# Patient Record
Sex: FEMALE | Race: WHITE | NOT HISPANIC OR LATINO | Employment: FULL TIME | URBAN - METROPOLITAN AREA
[De-identification: names, ages, dates, MRNs, and addresses within clinical notes are randomized per-mention and may not be internally consistent; named-entity substitution may affect disease eponyms.]

---

## 2017-02-25 ENCOUNTER — OFFICE VISIT (OUTPATIENT)
Dept: URGENT CARE | Facility: URGENT CARE | Age: 22
End: 2017-02-25
Payer: COMMERCIAL

## 2017-02-25 VITALS
WEIGHT: 114 LBS | HEART RATE: 88 BPM | DIASTOLIC BLOOD PRESSURE: 58 MMHG | SYSTOLIC BLOOD PRESSURE: 107 MMHG | TEMPERATURE: 98.9 F | OXYGEN SATURATION: 100 % | BODY MASS INDEX: 20.19 KG/M2

## 2017-02-25 DIAGNOSIS — H66.012 ACUTE SUPPURATIVE OTITIS MEDIA OF LEFT EAR WITH SPONTANEOUS RUPTURE OF TYMPANIC MEMBRANE, RECURRENCE NOT SPECIFIED: Primary | ICD-10-CM

## 2017-02-25 PROCEDURE — 99213 OFFICE O/P EST LOW 20 MIN: CPT | Performed by: PHYSICIAN ASSISTANT

## 2017-02-25 RX ORDER — AMOXICILLIN 875 MG
875 TABLET ORAL 2 TIMES DAILY
Qty: 20 TABLET | Refills: 0 | Status: SHIPPED | OUTPATIENT
Start: 2017-02-25 | End: 2017-05-03

## 2017-02-25 NOTE — NURSING NOTE
"Chief Complaint   Patient presents with     Otalgia     fluid on pillow this morning       Initial /58 (BP Location: Left arm, Patient Position: Chair, Cuff Size: Adult Regular)  Pulse 88  Temp 98.9  F (37.2  C) (Oral)  Wt 114 lb (51.7 kg)  SpO2 100%  BMI 20.19 kg/m2 Estimated body mass index is 20.19 kg/(m^2) as calculated from the following:    Height as of 5/5/16: 5' 3\" (1.6 m).    Weight as of this encounter: 114 lb (51.7 kg).  Medication Reconciliation: complete   Mirian Thakur CMA      "

## 2017-02-25 NOTE — PROGRESS NOTES
SUBJECTIVE:                                                    Zainab Sykes is a 21 year old female who presents to clinic today for the following health issues:      RESPIRATORY SYMPTOMS      Duration: 1 day    Description  nasal congestion, rhinorrhea, sore throat, facial pain/pressure, wheezing, ear pain left, headache and fatigue/malaise    Severity: moderate    Accompanying signs and symptoms: None    History (predisposing factors):  none    Precipitating or alleviating factors: None    Therapies tried and outcome:  none     Lots of drainage from left ear on her pillow. Left ear pain bothers her the most.  H/o ear infections and PE tubes as a child      No Known Allergies    Past Medical History   Diagnosis Date     Anxiety and depression          Current Outpatient Prescriptions on File Prior to Visit:  loratadine (CLARITIN) 10 MG tablet take 10 mg by mouth daily.   acetaminophen (TYLENOL) 325 MG tablet take 1-2 Tabs by mouth every 6 hours as needed.   ibuprofen (ADVIL,MOTRIN) 200 MG tablet take 200 mg by mouth every 4 hours as needed.     No current facility-administered medications on file prior to visit.     Social History   Substance Use Topics     Smoking status: Current Some Day Smoker     Smokeless tobacco: Never Used     Alcohol use No       ROS:  Consitutional: As above  ENT: As above  Respiratory: As above    OBJECTIVE:  /58 (BP Location: Left arm, Patient Position: Chair, Cuff Size: Adult Regular)  Pulse 88  Temp 98.9  F (37.2  C) (Oral)  Wt 114 lb (51.7 kg)  SpO2 100%  BMI 20.19 kg/m2  GENERAL APPEARANCE: healthy, alert and no distress  EYES: conjunctiva clear  EARS: No cerumen.   Ear canals w/o erythema. L TM dull, scar from prior PE tube. No obvious perforation but had quite a bit of drainage on pillow.    NOSE/MOUTH: Nose and mouth without ulcers, erythema or lesions  SINUSES: No maxillary sinus tenderness.  THROAT: Mild erythema w/o tonsillar enlargement . No exudates  NECK: supple,  nontender, no lymphadenopathy  RESP: lungs clear to auscultation - no rales, rhonchi or wheezes  CV: regular rates and rhythm, normal S1 S2, no murmur noted  NEURO: awake, alert        ASSESSMENT: Well appearing.    ICD-10-CM    1. Acute suppurative otitis media of left ear with spontaneous rupture of tympanic membrane, recurrence not specified H66.012 amoxicillin (AMOXIL) 875 MG tablet         PLAN:  Lots of rest and fluids.  RTC if any worsening symptoms or if not improving.    Jyotsna Pace PA-C

## 2017-02-25 NOTE — LETTER
[facility]  [address]  [place] [address]  Phone: [phone]    February 25, 2017        Zainab Sykes  [address] [place] [address] [address] [place] [address]          To whom it may concern:    RE: Zainab Sykes    Patient was seen and treated today at our clinic and missed work.    Please contact me for questions or concerns.      Sincerely,        Jyotsna Pace PA-C

## 2017-02-25 NOTE — MR AVS SNAPSHOT
After Visit Summary   2/25/2017    Zainab Sykes    MRN: [id number]           Patient Information     Date Of Birth          1995        Visit Information        Provider Department      2/25/2017 12:40 PM Jyotsna Pace PA-C [facility]        Today's Diagnoses     Acute suppurative otitis media of left ear with spontaneous rupture of tympanic membrane, recurrence not specified    -  1       Follow-ups after your visit        Who to contact     If you have questions or need follow up information about today's clinic visit or your schedule please contact [facility] directly at [phone].  Normal or non-critical lab and imaging results will be communicated to you by MyChart, letter or phone within 4 business days after the clinic has received the results. If you do not hear from us within 7 days, please contact the clinic through [employer] or phone. If you have a critical or abnormal lab result, we will notify you by phone as soon as possible.  Submit refill requests through [employer] or call your pharmacy and they will forward the refill request to us. Please allow 3 business days for your refill to be completed.          Additional Information About Your Visit        MyChart Information     [employer] gives you secure access to your electronic health record. If you see a primary care provider, you can also send messages to your care team and make appointments. If you have questions, please call your primary care clinic.  If you do not have a primary care provider, please call [phone] and they will assist you.        Care EveryWhere ID     This is your Care EveryWhere ID. This could be used by other organizations to access your [facility] medical records  [id number]        Your Vitals Were     Pulse Temperature Pulse Oximetry BMI (Body Mass Index)          88 98.9  F (37.2  C) (Oral) 100% 20.19 kg/m2         Blood Pressure from Last 3 Encounters:    02/25/17 107/58   05/05/16 102/60   03/04/16 94/60    Weight from Last 3 Encounters:   02/25/17 114 lb (51.7 kg) (22 %)*   05/05/16 109 lb 9.6 oz (49.7 kg) (17 %)*   03/04/16 109 lb 12.8 oz (49.8 kg) (17 %)*     * Growth percentiles are based on [facility] 2-20 Years data.              Today, you had the following     No orders found for display         Today's Medication Changes          These changes are accurate as of: 2/25/17  1:48 PM.  If you have any questions, ask your nurse or doctor.               Start taking these medicines.        Dose/Directions    amoxicillin 875 MG tablet   Commonly known as:  AMOXIL   Used for:  Acute suppurative otitis media of left ear with spontaneous rupture of tympanic membrane, recurrence not specified   Started by:  Jyotsna Pace PA-C        Dose:  875 mg   Take 1 tablet (875 mg) by mouth 2 times daily   Quantity:  20 tablet   Refills:  0            Where to get your medicines      These medications were sent to [facility] - [place], [place] - [address] [facility] [address] [facility] [address]     Phone:  [phone]     amoxicillin 875 MG tablet                Primary Care Provider    None Specified       No primary provider on file.        Thank you!     Thank you for choosing [facility]  for your care. Our goal is always to provide you with excellent care. Hearing back from our patients is one way we can continue to improve our services. Please take a few minutes to complete the written survey that you may receive in the mail after your visit with us. Thank you!             Your Updated Medication List - Protect others around you: Learn how to safely use, store and throw away your medicines at www.disposemymeds.org.          This list is accurate as of: 2/25/17  1:48 PM.  Always use your most recent med list.                   Brand Name Dispense Instructions for use    amoxicillin 875 MG tablet     AMOXIL    20 tablet    Take 1 tablet (875 mg) by mouth 2 times daily       CLARITIN 10 MG tablet   Generic drug:  loratadine      take 10 mg by mouth daily.       etonogestrel 68 MG Impl    IMPLANON/NEXPLANON     1 each by Subdermal route once       ibuprofen 200 MG tablet    ADVIL/MOTRIN     take 200 mg by mouth every 4 hours as needed.       TYLENOL 325 MG tablet   Generic drug:  acetaminophen      take 1-2 Tabs by mouth every 6 hours as needed.

## 2017-04-28 ENCOUNTER — RADIANT APPOINTMENT (OUTPATIENT)
Dept: GENERAL RADIOLOGY | Facility: CLINIC | Age: 22
End: 2017-04-28
Attending: PEDIATRICS
Payer: COMMERCIAL

## 2017-04-28 ENCOUNTER — OFFICE VISIT (OUTPATIENT)
Dept: FAMILY MEDICINE | Facility: CLINIC | Age: 22
End: 2017-04-28
Payer: OTHER MISCELLANEOUS

## 2017-04-28 VITALS
TEMPERATURE: 98.9 F | HEIGHT: 63 IN | OXYGEN SATURATION: 99 % | SYSTOLIC BLOOD PRESSURE: 123 MMHG | WEIGHT: 113.8 LBS | DIASTOLIC BLOOD PRESSURE: 69 MMHG | BODY MASS INDEX: 20.16 KG/M2 | HEART RATE: 115 BPM

## 2017-04-28 DIAGNOSIS — R07.81 RIB PAIN: Primary | ICD-10-CM

## 2017-04-28 DIAGNOSIS — R07.81 RIB PAIN: ICD-10-CM

## 2017-04-28 PROCEDURE — 99213 OFFICE O/P EST LOW 20 MIN: CPT | Performed by: PEDIATRICS

## 2017-04-28 PROCEDURE — 71101 X-RAY EXAM UNILAT RIBS/CHEST: CPT | Mod: RT

## 2017-04-28 RX ORDER — HYDROCODONE BITARTRATE AND ACETAMINOPHEN 5; 325 MG/1; MG/1
1-2 TABLET ORAL EVERY 6 HOURS PRN
Qty: 20 TABLET | Refills: 0 | Status: SHIPPED | OUTPATIENT
Start: 2017-04-28 | End: 2019-08-02

## 2017-04-28 ASSESSMENT — PAIN SCALES - GENERAL: PAINLEVEL: EXTREME PAIN (9)

## 2017-04-28 NOTE — MR AVS SNAPSHOT
After Visit Summary   4/28/2017    Zainab Sykes    MRN: [id number]           Patient Information     Date Of Birth          1995        Visit Information        Provider Department      4/28/2017 3:20 PM Natalia Hazel MD [facility]        Today's Diagnoses     Rib pain    -  1      Care Instructions    Based on your medical history and these are the current health maintenance or preventive care services that you are due for (some may have been done at this visit)  Health Maintenance Due   Topic Date Due     CHLAMYDIA SCREENING  1995     HPV IMMUNIZATION (1 of 3 - Female 3 Dose Series) 06/15/2008     PEDS DTAP/TDAP (2 - Td) 04/01/2016         At [facility], we strive to deliver an exceptional experience to you, every time we see you.    If you receive a survey in the mail, please send us back your thoughts. We really do value your feedback.    Your care team's suggested websites for health information:  Www.[employer].org : Up to date and easily searchable information on multiple topics.  Www.medlineplus.gov : medication info, interactive tutorials, watch real surgeries online  Www.familydoctor.org : good info from the Academy of Family Physicians  Www.cdc.gov : public health info, travel advisories, epidemics (H1N1)  Www.aap.org : children's health info, normal development, vaccinations  Www.health.[facility].[place].us : [place] dept of health, public health issues in [place], N1N1    How to contact your care team:   Team Katelyn/Spirit [phone]         Pharmacy [phone]    Dr. Marcus, Reshma Carlson PA-C, Dr. Singletary, Vanda Gardner APRN CNP, Melanie Gooden PA-C, Dr. Hazel, and RONY Hoskins CNP    Team RNs: Padmini & Teresa      Clinic hours  M-Th 7 am-7 pm   Fri 7 am-5 pm.   Urgent care M-F 11 am-9 pm,   Sat/Sun 9 am-5 pm.  Pharmacy M-Th 8 am-8 pm Fri 8 am-6 pm  Sat/Sun 9 am-5 pm.     All password changes, disabled accounts, or ID  changes in MyChart/MyHealth will be done by our Access Services Department.    If you need help with your account or password, call: [phone]. Clinic staff no longer has the ability to change passwords.     Rib Contusion or Minor Fracture    A rib contusion is a bruise to one or more rib bones. It may cause pain, tenderness, swelling, and a purplish tint to the skin. There may be a sharp pain with each breath. A rib contusion takes anywhere from a few days to a few weeks to heal. A minor rib fracture or break may cause the same symptoms as a rib contusion. The small crack may not be seen on a regular chest X-ray. Treatment for both problems is the same.  Home care    You may use over-the-counter pain medicine to control pain, unless another pain medicine was prescribed. If you have chronic liver or kidney disease or ever had a stomach ulcer or GI bleeding, talk with your healthcare provider before using these medicines.    Rest. Do not lift anything heavy or do any activity that causes pain.    Apply an ice pack over the injured area for 15 to 20 minutes every 1 to 2 hours. You should do this for the first 24 to 48 hours. You can make an ice pack by filling a plastic bag that seals at the top with ice cubes and then wrapping it with a thin towel. Continue with ice packs as needed for the relief of pain and swelling.    The first 3 to 4 weeks of healing will be the most painful. If your pain is not under control with the treatment given, call your healthcare provider. Sometimes a stronger pain medicine may be needed. A nerve block can be done in case of severe pain. It will numb the nerve between the ribs.  Follow-up care  Follow up with your healthcare provider, or as advised.  If X-rays were taken, you will be told of any new findings that may affect your care.  Call 911   Call 911 if you have:    Dizziness, weakness or fainting    Shortness of breath with or without chest discomfort    New or worsening  "pain  When to seek medical advice  Call your healthcare provider right away if any of these occur:    Fever of 100.4 F (38 C) or above lasting for 24 to 48 hours    Stomach pain    5639-6756 [employer]. [address] [place] [address], [place] [address]. All rights reserved. This information is not intended as a substitute for professional medical care. Always follow your healthcare professional's instructions.              Follow-ups after your visit        Who to contact     If you have questions or need follow up information about today's clinic visit or your schedule please contact [facility] directly at [phone].  Normal or non-critical lab and imaging results will be communicated to you by MyChart, letter or phone within 4 business days after the clinic has received the results. If you do not hear from us within 7 days, please contact the clinic through [email or web address] or phone. If you have a critical or abnormal lab result, we will notify you by phone as soon as possible.  Submit refill requests through [employer] or call your pharmacy and they will forward the refill request to us. Please allow 3 business days for your refill to be completed.          Additional Information About Your Visit        MyChart Information     [employer] gives you secure access to your electronic health record. If you see a primary care provider, you can also send messages to your care team and make appointments. If you have questions, please call your primary care clinic.  If you do not have a primary care provider, please call [phone] and they will assist you.        Care EveryWhere ID     This is your Care EveryWhere ID. This could be used by other organizations to access your [facility] medical records  [id number]        Your Vitals Were     Pulse Temperature Height Pulse Oximetry BMI (Body Mass Index)       115 98.9  F (37.2  C) (Oral) 5' 3\" (1.6 m) 99% 20.16 kg/m2        Blood Pressure from Last 3 " Encounters:   04/28/17 123/69   02/25/17 107/58   05/05/16 102/60    Weight from Last 3 Encounters:   04/28/17 113 lb 12.8 oz (51.6 kg) (22 %)*   02/25/17 114 lb (51.7 kg) (22 %)*   05/05/16 109 lb 9.6 oz (49.7 kg) (17 %)*     * Growth percentiles are based on [facility] 2-20 Years data.                 Today's Medication Changes          These changes are accurate as of: 4/28/17  4:17 PM.  If you have any questions, ask your nurse or doctor.               Start taking these medicines.        Dose/Directions    HYDROcodone-acetaminophen 5-325 MG per tablet   Commonly known as:  NORCO   Used for:  Rib pain   Started by:  Natalia Hazel MD        Dose:  1-2 tablet   Take 1-2 tablets by mouth every 6 hours as needed for moderate to severe pain maximum 8 tablet(s) per day   Quantity:  20 tablet   Refills:  0            Where to get your medicines      Some of these will need a paper prescription and others can be bought over the counter.  Ask your nurse if you have questions.     Bring a paper prescription for each of these medications     HYDROcodone-acetaminophen 5-325 MG per tablet                Primary Care Provider    None Specified       No primary provider on file.        Thank you!     Thank you for choosing [facility]  for your care. Our goal is always to provide you with excellent care. Hearing back from our patients is one way we can continue to improve our services. Please take a few minutes to complete the written survey that you may receive in the mail after your visit with us. Thank you!             Your Updated Medication List - Protect others around you: Learn how to safely use, store and throw away your medicines at www.disposemymeds.org.          This list is accurate as of: 4/28/17  4:17 PM.  Always use your most recent med list.                   Brand Name Dispense Instructions for use    amoxicillin 875 MG tablet    AMOXIL    20 tablet    Take 1 tablet (875 mg) by mouth 2  times daily       CLARITIN 10 MG tablet   Generic drug:  loratadine      Take 10 mg by mouth daily Reported on 4/28/2017       etonogestrel 68 MG Impl    IMPLANON/NEXPLANON     1 each by Subdermal route once       HYDROcodone-acetaminophen 5-325 MG per tablet    NORCO    20 tablet    Take 1-2 tablets by mouth every 6 hours as needed for moderate to severe pain maximum 8 tablet(s) per day       ibuprofen 200 MG tablet    ADVIL/MOTRIN     Take 200 mg by mouth every 4 hours as needed Reported on 4/28/2017       TYLENOL 325 MG tablet   Generic drug:  acetaminophen      Take 1-2 tablets by mouth every 6 hours as needed Reported on 4/28/2017

## 2017-04-28 NOTE — NURSING NOTE
"Chief Complaint   Patient presents with     Rib Injury       Initial /69 (BP Location: Left arm, Patient Position: Chair, Cuff Size: Adult Regular)  Pulse 115  Temp 98.9  F (37.2  C) (Oral)  Ht 5' 3\" (1.6 m)  Wt 113 lb 12.8 oz (51.6 kg)  SpO2 99%  BMI 20.16 kg/m2 Estimated body mass index is 20.16 kg/(m^2) as calculated from the following:    Height as of this encounter: 5' 3\" (1.6 m).    Weight as of this encounter: 113 lb 12.8 oz (51.6 kg).  Medication Reconciliation: complete         Margret Sherman MA  3:43 PM 4/28/2017    "

## 2017-04-28 NOTE — LETTER
[address] [address]  [phone]    April 28, 2017        Zainab Sykes  [address] [facility] [address] [place] [address]          To whom it may concern:    This patient was seen in clinic on 4/28/2017.  She has suffered a rib injury.  Please excuse her from heavy lifting until the pain has resolved (estimated recovery time 1-2 weeks).    Please contact me for questions or concerns.        Sincerely,        Natalia Hazel MD

## 2017-04-28 NOTE — PATIENT INSTRUCTIONS
Based on your medical history and these are the current health maintenance or preventive care services that you are due for (some may have been done at this visit)  Health Maintenance Due   Topic Date Due     CHLAMYDIA SCREENING  1995     HPV IMMUNIZATION (1 of 3 - Female 3 Dose Series) 06/15/2008     PEDS DTAP/TDAP (2 - Td) 04/01/2016         At [facility], we strive to deliver an exceptional experience to you, every time we see you.    If you receive a survey in the mail, please send us back your thoughts. We really do value your feedback.    Your care team's suggested websites for health information:  Www.[facility].org : Up to date and easily searchable information on multiple topics.  Www.medlineplus.gov : medication info, interactive tutorials, watch real surgeries online  Www.familydoctor.org : good info from the Academy of Family Physicians  Www.cdc.gov : public health info, travel advisories, epidemics (H1N1)  Www.aap.org : children's health info, normal development, vaccinations  Www.health.[facility].[place].us : [place] dept of health, public health issues in [place], N1N1    How to contact your care team:   Team Katelyn/Spirit [phone]         Pharmacy [phone]    Dr. Marcus, Reshma Carlson PA-C, Dr. Singletary, Vanda URIBE CNP, Melanie Gooden PA-C, Dr. Hazel, and RONY Hoskins CNP    Team RNs: Padmini & Teresa      Clinic hours  M-Th 7 am-7 pm   Fri 7 am-5 pm.   Urgent care M-F 11 am-9 pm,   Sat/Sun 9 am-5 pm.  Pharmacy M-Th 8 am-8 pm Fri 8 am-6 pm  Sat/Sun 9 am-5 pm.     All password changes, disabled accounts, or ID changes in [employer]/MyHealth will be done by our Access Services Department.    If you need help with your account or password, call: [phone]. Clinic staff no longer has the ability to change passwords.     Rib Contusion or Minor Fracture    A rib contusion is a bruise to one or more rib bones. It may cause pain, tenderness, swelling, and a  purplish tint to the skin. There may be a sharp pain with each breath. A rib contusion takes anywhere from a few days to a few weeks to heal. A minor rib fracture or break may cause the same symptoms as a rib contusion. The small crack may not be seen on a regular chest X-ray. Treatment for both problems is the same.  Home care    You may use over-the-counter pain medicine to control pain, unless another pain medicine was prescribed. If you have chronic liver or kidney disease or ever had a stomach ulcer or GI bleeding, talk with your healthcare provider before using these medicines.    Rest. Do not lift anything heavy or do any activity that causes pain.    Apply an ice pack over the injured area for 15 to 20 minutes every 1 to 2 hours. You should do this for the first 24 to 48 hours. You can make an ice pack by filling a plastic bag that seals at the top with ice cubes and then wrapping it with a thin towel. Continue with ice packs as needed for the relief of pain and swelling.    The first 3 to 4 weeks of healing will be the most painful. If your pain is not under control with the treatment given, call your healthcare provider. Sometimes a stronger pain medicine may be needed. A nerve block can be done in case of severe pain. It will numb the nerve between the ribs.  Follow-up care  Follow up with your healthcare provider, or as advised.  If X-rays were taken, you will be told of any new findings that may affect your care.  Call 911   Call 911 if you have:    Dizziness, weakness or fainting    Shortness of breath with or without chest discomfort    New or worsening pain  When to seek medical advice  Call your healthcare provider right away if any of these occur:    Fever of 100.4 F (38 C) or above lasting for 24 to 48 hours    Stomach pain    1916-0016 [employer]. [address] [place] [address], [place] [address]. All rights reserved. This information is not intended as a substitute for professional  medical care. Always follow your healthcare professional's instructions.

## 2017-04-28 NOTE — PROGRESS NOTES
SUBJECTIVE:                                                    Zainab Sykes is a 21 year old female who presents to clinic today for the following health issues:      Rib pain      Duration: 1 day    Description (location/character/radiation): Was lifting heavy boxes at work today at 9-10am, straightened back and felt a pop on right side/ribs.      Intensity:  9/10    Accompanying signs and symptoms: trouble breathing    History (similar episodes/previous evaluation): None    Precipitating or alleviating factors: None    Therapies tried and outcome: ibuprofen     Zainab was at work at [employer] lifting heavy boxes this afternoon, when she felt a popping and a sudden pain in her R lower anterior rib area.  She was able to finish out her shift, but is in significant pain.  It hurts to take a deep breath.  She took some Ibuprofen but that didn't help.     Problem list and histories reviewed & adjusted, as indicated.  Additional history: as documented    Patient Active Problem List   Diagnosis     Palpitations     Constipation     History reviewed. No pertinent surgical history.    Social History   Substance Use Topics     Smoking status: Current Some Day Smoker     Smokeless tobacco: Never Used     Alcohol use No     Family History   Problem Relation Age of Onset     DIABETES Paternal Grandfather      Coronary Artery Disease Paternal Grandfather      Hypertension Paternal Grandfather      Hyperlipidemia Paternal Grandfather      Hypertension Father      Hyperlipidemia Father      CEREBROVASCULAR DISEASE Father      Leukemia Father      CLL     Breast Cancer Father      Colon Cancer Father      Chronic Obstructive Pulmonary Disease Other      Leukemia Maternal Aunt      X2     Ovarian Cancer Maternal Aunt      Depression Brother      Thyroid Disease Mother      Substance Abuse No family hx of      Asthma No family hx of          Current Outpatient Prescriptions   Medication Sig Dispense Refill      "HYDROcodone-acetaminophen (NORCO) 5-325 MG per tablet Take 1-2 tablets by mouth every 6 hours as needed for moderate to severe pain maximum 8 tablet(s) per day 20 tablet 0     etonogestrel (IMPLANON/NEXPLANON) 68 MG IMPL 1 each by Subdermal route once       amoxicillin (AMOXIL) 875 MG tablet Take 1 tablet (875 mg) by mouth 2 times daily (Patient not taking: Reported on 4/28/2017) 20 tablet 0     loratadine (CLARITIN) 10 MG tablet Take 10 mg by mouth daily Reported on 4/28/2017       acetaminophen (TYLENOL) 325 MG tablet Take 1-2 tablets by mouth every 6 hours as needed Reported on 4/28/2017       ibuprofen (ADVIL,MOTRIN) 200 MG tablet Take 200 mg by mouth every 4 hours as needed Reported on 4/28/2017         Reviewed and updated as needed this visit by clinical staff  Tobacco  Allergies  Problems  Med Hx  Surg Hx  Fam Hx  Soc Hx      Reviewed and updated as needed this visit by Provider  Problems         ROS:  Constitutional, HEENT, cardiovascular, pulmonary, gi and gu systems are negative, except as otherwise noted.    OBJECTIVE:                                                    /69 (BP Location: Left arm, Patient Position: Chair, Cuff Size: Adult Regular)  Pulse 115  Temp 98.9  F (37.2  C) (Oral)  Ht 5' 3\" (1.6 m)  Wt 113 lb 12.8 oz (51.6 kg)  SpO2 99%  BMI 20.16 kg/m2  Body mass index is 20.16 kg/(m^2).  GENERAL: healthy, alert and in mild distress holding her R side  NECK: no adenopathy, no asymmetry, masses, or scars and thyroid normal to palpation  RESP: lungs clear to auscultation - no rales, rhonchi or wheezes  RESP: R anterior ribs tender to palpation, no deformities noted  CV: regular rate and rhythm, normal S1 S2, no S3 or S4, no murmur, click or rub, no peripheral edema and peripheral pulses strong  ABDOMEN: soft, nontender, no hepatosplenomegaly, no masses and bowel sounds normal  MS: no gross musculoskeletal defects noted, no edema    Rib xray negative     ASSESSMENT/PLAN:           "                                          1. Rib pain  Most likely a pulled muscle  Recommend rest, ice  Letter written for work  - XR Ribs & Chest Right G/E 3 Views; Future  - HYDROcodone-acetaminophen (NORCO) 5-325 MG per tablet; Take 1-2 tablets by mouth every 6 hours as needed for moderate to severe pain maximum 8 tablet(s) per day  Dispense: 20 tablet; Refill: 0, side effect discussed    Follow-up if pain worsens or if not improving in 1 week    Natalia Hazel MD  [facility]

## 2017-05-03 ENCOUNTER — OFFICE VISIT (OUTPATIENT)
Dept: FAMILY MEDICINE | Facility: CLINIC | Age: 22
End: 2017-05-03
Payer: COMMERCIAL

## 2017-05-03 VITALS
WEIGHT: 112.6 LBS | HEART RATE: 101 BPM | SYSTOLIC BLOOD PRESSURE: 111 MMHG | OXYGEN SATURATION: 99 % | DIASTOLIC BLOOD PRESSURE: 68 MMHG | TEMPERATURE: 100.7 F | BODY MASS INDEX: 19.95 KG/M2

## 2017-05-03 DIAGNOSIS — H65.191 ACUTE MUCOID OTITIS MEDIA OF RIGHT EAR: ICD-10-CM

## 2017-05-03 DIAGNOSIS — R07.0 THROAT PAIN: Primary | ICD-10-CM

## 2017-05-03 LAB
DEPRECATED S PYO AG THROAT QL EIA: NORMAL
MICRO REPORT STATUS: NORMAL
SPECIMEN SOURCE: NORMAL

## 2017-05-03 PROCEDURE — 87880 STREP A ASSAY W/OPTIC: CPT | Performed by: PEDIATRICS

## 2017-05-03 PROCEDURE — 87081 CULTURE SCREEN ONLY: CPT | Performed by: PEDIATRICS

## 2017-05-03 PROCEDURE — 99213 OFFICE O/P EST LOW 20 MIN: CPT | Performed by: PEDIATRICS

## 2017-05-03 RX ORDER — AMOXICILLIN 875 MG
875 TABLET ORAL 2 TIMES DAILY
Qty: 20 TABLET | Refills: 0 | Status: SHIPPED | OUTPATIENT
Start: 2017-05-03 | End: 2018-09-10

## 2017-05-03 NOTE — PROGRESS NOTES
"  SUBJECTIVE:                                                    Zainab Sykes is a 21 year old female who presents to clinic today for the following health issues:      Medication Followup of Norco    Taking Medication as prescribed: yes    Side Effects:  None    Medication Helping Symptoms:  yes     RESPIRATORY SYMPTOMS      Duration: 3-4 days    Description  nasal congestion, rhinorrhea, sore throat, cough, ear pain right and headache    Severity: moderate    Accompanying signs and symptoms: None    History (predisposing factors):  none    Precipitating or alleviating factors: None    Therapies tried and outcome:  norco for ribs     Pedro ribs are slowly improving (see previous note).  She is taking Norco once per day for pain but is having some nausea/vomiting from the Norco.      She has had a sore throat, headache, and stuffy nose for the past 4 days.  She has had R ear pain today.  At one point she yawned and it felt like her ear \"exploded\".  Since then there has been some yellow-clear fluid draining out of her R ear.  She hasn't measured a fever at home but has one here in clinic.  She hasn't taken any medication today.  She is not coughing .  She has had no sick contacts.  She is able to drink liquids.  She says the pain in her ear and throat is \"the worst pain I've ever had\".           Problem list and histories reviewed & adjusted, as indicated.  Additional history: as documented    Patient Active Problem List   Diagnosis     Palpitations     Constipation     History reviewed. No pertinent surgical history.    Social History   Substance Use Topics     Smoking status: Current Some Day Smoker     Smokeless tobacco: Never Used     Alcohol use No     Family History   Problem Relation Age of Onset     DIABETES Paternal Grandfather      Coronary Artery Disease Paternal Grandfather      Hypertension Paternal Grandfather      Hyperlipidemia Paternal Grandfather      Hypertension Father      Hyperlipidemia " Father      CEREBROVASCULAR DISEASE Father      Leukemia Father      CLL     Breast Cancer Father      Colon Cancer Father      Chronic Obstructive Pulmonary Disease Other      Leukemia Maternal Aunt      X2     Ovarian Cancer Maternal Aunt      Depression Brother      Thyroid Disease Mother      Substance Abuse No family hx of      Asthma No family hx of          Current Outpatient Prescriptions   Medication Sig Dispense Refill     HYDROcodone-acetaminophen (NORCO) 5-325 MG per tablet Take 1-2 tablets by mouth every 6 hours as needed for moderate to severe pain maximum 8 tablet(s) per day (Patient not taking: Reported on 5/3/2017) 20 tablet 0     etonogestrel (IMPLANON/NEXPLANON) 68 MG IMPL 1 each by Subdermal route once Reported on 5/3/2017       amoxicillin (AMOXIL) 875 MG tablet Take 1 tablet (875 mg) by mouth 2 times daily (Patient not taking: Reported on 4/28/2017) 20 tablet 0     loratadine (CLARITIN) 10 MG tablet Take 10 mg by mouth daily Reported on 5/3/2017       acetaminophen (TYLENOL) 325 MG tablet Take 1-2 tablets by mouth every 6 hours as needed Reported on 5/3/2017       ibuprofen (ADVIL,MOTRIN) 200 MG tablet Take 200 mg by mouth every 4 hours as needed Reported on 5/3/2017         Reviewed and updated as needed this visit by clinical staff  Problems       Reviewed and updated as needed this visit by Provider  Problems         ROS:  Constitutional, HEENT, cardiovascular, pulmonary, gi and gu systems are negative, except as otherwise noted.    OBJECTIVE:                                                    /68  Pulse 101  Temp 100.7  F (38.2  C) (Oral)  Wt 112 lb 9.6 oz (51.1 kg)  SpO2 99%  BMI 19.95 kg/m2  Body mass index is 19.95 kg/(m^2).  GENERAL: healthy, alert and no distress  EYES: Eyes grossly normal to inspection, PERRL and conjunctivae and sclerae normal  HENT: normal cephalic/atraumatic, right ear: erythematous, bulging membrane, mucopurulent effusion and no obvious TM rupture or  drainage in canal, left ear: normal: no effusions, no erythema, normal landmarks, nose and mouth without ulcers or lesions, oral mucous membranes moist and tonsillar erythema  NECK: no adenopathy, no asymmetry, masses, or scars and thyroid normal to palpation  RESP: lungs clear to auscultation - no rales, rhonchi or wheezes  CV: regular rate and rhythm, normal S1 S2, no S3 or S4, no murmur, click or rub, no peripheral edema and peripheral pulses strong  ABDOMEN: soft, nontender, no hepatosplenomegaly, no masses and bowel sounds normal  MS: no gross musculoskeletal defects noted, no edema  SKIN: no suspicious lesions or rashes  NEURO: Normal strength and tone, mentation intact and speech normal  PSYCH: mentation appears normal, affect normal/bright    Diagnostic Test Results:  Results for orders placed or performed in visit on 05/03/17 (from the past 24 hour(s))   Rapid strep screen   Result Value Ref Range    Specimen Description Throat     Rapid Strep A Screen       NEGATIVE: No Group A streptococcal antigen detected by immunoassay, await   culture report.      Micro Report Status FINAL 05/03/2017         ASSESSMENT/PLAN:                                                    1. Throat pain    - Rapid strep screen  - Beta strep group A culture    2. Acute mucoid otitis media of right ear  Ok to use Norco for pain.  Recommend taking with food.  Avoid putting liquids or objects in ear canal in case of TM rupture.    - amoxicillin (AMOXIL) 875 MG tablet; Take 1 tablet (875 mg) by mouth 2 times daily  Dispense: 20 tablet; Refill: 0    Follow-up if worsening or not improving in 2 days    Natalia Hazel MD  [facility]

## 2017-05-03 NOTE — MR AVS SNAPSHOT
After Visit Summary   5/3/2017    Zainab Sykse    MRN: [id number]           Patient Information     Date Of Birth          1995        Visit Information        Provider Department      5/3/2017 3:40 PM Natalia Hazel MD [facility]        Today's Diagnoses     Throat pain    -  1    Acute mucoid otitis media of right ear           Follow-ups after your visit        Who to contact     If you have questions or need follow up information about today's clinic visit or your schedule please contact [facility] directly at [phone].  Normal or non-critical lab and imaging results will be communicated to you by MyChart, letter or phone within 4 business days after the clinic has received the results. If you do not hear from us within 7 days, please contact the clinic through [facility] or phone. If you have a critical or abnormal lab result, we will notify you by phone as soon as possible.  Submit refill requests through [employer] or call your pharmacy and they will forward the refill request to us. Please allow 3 business days for your refill to be completed.          Additional Information About Your Visit        MyChart Information     [employer] gives you secure access to your electronic health record. If you see a primary care provider, you can also send messages to your care team and make appointments. If you have questions, please call your primary care clinic.  If you do not have a primary care provider, please call [phone] and they will assist you.        Care EveryWhere ID     This is your Care EveryWhere ID. This could be used by other organizations to access your [facility] medical records  [id number]        Your Vitals Were     Pulse Temperature Pulse Oximetry BMI (Body Mass Index)          101 100.7  F (38.2  C) (Oral) 99% 19.95 kg/m2         Blood Pressure from Last 3 Encounters:   05/03/17 111/68   04/28/17 123/69   02/25/17 107/58     Weight from Last 3 Encounters:   05/03/17 112 lb 9.6 oz (51.1 kg) (19 %)*   04/28/17 113 lb 12.8 oz (51.6 kg) (22 %)*   02/25/17 114 lb (51.7 kg) (22 %)*     * Growth percentiles are based on [facility] 2-20 Years data.              We Performed the Following     Rapid strep screen          Where to get your medicines      These medications were sent to [facility] [address] [place] [address] [place] - [address] [place] AT [facility] [address]  [address] [place], [place] [address]     Phone:  [phone]     amoxicillin 875 MG tablet          Primary Care Provider    None Specified       No primary provider on file.        Thank you!     Thank you for choosing [facility]  for your care. Our goal is always to provide you with excellent care. Hearing back from our patients is one way we can continue to improve our services. Please take a few minutes to complete the written survey that you may receive in the mail after your visit with us. Thank you!             Your Updated Medication List - Protect others around you: Learn how to safely use, store and throw away your medicines at www.disposemymeds.org.          This list is accurate as of: 5/3/17  4:06 PM.  Always use your most recent med list.                   Brand Name Dispense Instructions for use    amoxicillin 875 MG tablet    AMOXIL    20 tablet    Take 1 tablet (875 mg) by mouth 2 times daily       CLARITIN 10 MG tablet   Generic drug:  loratadine      Take 10 mg by mouth daily Reported on 5/3/2017       etonogestrel 68 MG Impl    IMPLANON/NEXPLANON     1 each by Subdermal route once Reported on 5/3/2017       HYDROcodone-acetaminophen 5-325 MG per tablet    NORCO    20 tablet    Take 1-2 tablets by mouth every 6 hours as needed for moderate to severe pain maximum 8 tablet(s) per day       ibuprofen 200 MG tablet    ADVIL/MOTRIN     Take 200 mg by mouth every 4 hours as needed Reported on 5/3/2017       TYLENOL 325 MG tablet   Generic  drug:  acetaminophen      Take 1-2 tablets by mouth every 6 hours as needed Reported on 5/3/2017

## 2017-05-03 NOTE — PROGRESS NOTES
Sore throat, headache, stuffy nose saturday, tonsils swollen  R ear pain today, felt like ear exploded, liquid coming out of R ear  Last took Norco yesterday, one a day  Drinking liquid      Rib pain slowly improving

## 2017-05-04 LAB
BACTERIA SPEC CULT: NORMAL
MICRO REPORT STATUS: NORMAL
SPECIMEN SOURCE: NORMAL

## 2017-05-05 NOTE — PROGRESS NOTES
Dear parents of Zainab Sykes's throat culture is negative for Strep.  Please don't hesitate to call me if you have any questions.    Sincerely,  Natalia Hazel M.D.  [phone]

## 2017-10-08 ENCOUNTER — HEALTH MAINTENANCE LETTER (OUTPATIENT)
Age: 22
End: 2017-10-08

## 2018-08-20 ENCOUNTER — HEALTH MAINTENANCE LETTER (OUTPATIENT)
Age: 23
End: 2018-08-20

## 2018-09-10 ENCOUNTER — OFFICE VISIT (OUTPATIENT)
Dept: FAMILY MEDICINE | Facility: CLINIC | Age: 23
End: 2018-09-10
Payer: COMMERCIAL

## 2018-09-10 VITALS
HEART RATE: 80 BPM | SYSTOLIC BLOOD PRESSURE: 122 MMHG | DIASTOLIC BLOOD PRESSURE: 72 MMHG | OXYGEN SATURATION: 98 % | TEMPERATURE: 98 F | HEIGHT: 63 IN | BODY MASS INDEX: 20.23 KG/M2 | WEIGHT: 114.2 LBS

## 2018-09-10 DIAGNOSIS — A56.09 CHLAMYDIA VAGINITIS/CERVICITIS: ICD-10-CM

## 2018-09-10 DIAGNOSIS — A56.02 CHLAMYDIA VAGINITIS/CERVICITIS: ICD-10-CM

## 2018-09-10 DIAGNOSIS — Z11.3 SCREEN FOR STD (SEXUALLY TRANSMITTED DISEASE): ICD-10-CM

## 2018-09-10 DIAGNOSIS — Z00.00 ROUTINE HISTORY AND PHYSICAL EXAMINATION OF ADULT: Primary | ICD-10-CM

## 2018-09-10 DIAGNOSIS — Z13.6 CARDIOVASCULAR SCREENING; LDL GOAL LESS THAN 160: ICD-10-CM

## 2018-09-10 DIAGNOSIS — Z23 NEED FOR INFLUENZA VACCINATION: ICD-10-CM

## 2018-09-10 DIAGNOSIS — Z23 NEED FOR HPV VACCINE: ICD-10-CM

## 2018-09-10 DIAGNOSIS — Z12.4 SCREENING FOR MALIGNANT NEOPLASM OF CERVIX: ICD-10-CM

## 2018-09-10 DIAGNOSIS — N76.0 BV (BACTERIAL VAGINOSIS): ICD-10-CM

## 2018-09-10 DIAGNOSIS — N92.1 METRORRHAGIA: ICD-10-CM

## 2018-09-10 DIAGNOSIS — B96.89 BV (BACTERIAL VAGINOSIS): ICD-10-CM

## 2018-09-10 LAB
ALBUMIN SERPL-MCNC: 4.2 G/DL (ref 3.4–5)
ALBUMIN UR-MCNC: NEGATIVE MG/DL
ALP SERPL-CCNC: 68 U/L (ref 40–150)
ALT SERPL W P-5'-P-CCNC: 17 U/L (ref 0–50)
APPEARANCE UR: CLEAR
AST SERPL W P-5'-P-CCNC: 10 U/L (ref 0–45)
BETA HCG QUAL IFA URINE: NEGATIVE
BILIRUB DIRECT SERPL-MCNC: 0.1 MG/DL (ref 0–0.2)
BILIRUB SERPL-MCNC: 0.5 MG/DL (ref 0.2–1.3)
BILIRUB UR QL STRIP: NEGATIVE
CHOLEST SERPL-MCNC: 123 MG/DL
COLOR UR AUTO: YELLOW
GLUCOSE SERPL-MCNC: 79 MG/DL (ref 70–99)
GLUCOSE UR STRIP-MCNC: NEGATIVE MG/DL
HDLC SERPL-MCNC: 87 MG/DL
HGB BLD-MCNC: 13.6 G/DL (ref 11.7–15.7)
HGB UR QL STRIP: ABNORMAL
KETONES UR STRIP-MCNC: NEGATIVE MG/DL
LDLC SERPL CALC-MCNC: 18 MG/DL
LEUKOCYTE ESTERASE UR QL STRIP: NEGATIVE
NITRATE UR QL: NEGATIVE
NONHDLC SERPL-MCNC: 36 MG/DL
PH UR STRIP: 7.5 PH (ref 5–7)
PROT SERPL-MCNC: 7.8 G/DL (ref 6.8–8.8)
RBC #/AREA URNS AUTO: ABNORMAL /HPF
SOURCE: ABNORMAL
SP GR UR STRIP: 1.01 (ref 1–1.03)
SPECIMEN SOURCE: ABNORMAL
TRIGL SERPL-MCNC: 91 MG/DL
UROBILINOGEN UR STRIP-ACNC: 0.2 EU/DL (ref 0.2–1)
WBC #/AREA URNS AUTO: ABNORMAL /HPF
WET PREP SPEC: ABNORMAL

## 2018-09-10 PROCEDURE — 87491 CHLMYD TRACH DNA AMP PROBE: CPT | Performed by: NURSE PRACTITIONER

## 2018-09-10 PROCEDURE — G0145 SCR C/V CYTO,THINLAYER,RESCR: HCPCS | Performed by: NURSE PRACTITIONER

## 2018-09-10 PROCEDURE — 85018 HEMOGLOBIN: CPT | Performed by: NURSE PRACTITIONER

## 2018-09-10 PROCEDURE — 87389 HIV-1 AG W/HIV-1&-2 AB AG IA: CPT | Performed by: NURSE PRACTITIONER

## 2018-09-10 PROCEDURE — 99213 OFFICE O/P EST LOW 20 MIN: CPT | Mod: 25 | Performed by: NURSE PRACTITIONER

## 2018-09-10 PROCEDURE — 90472 IMMUNIZATION ADMIN EACH ADD: CPT | Performed by: NURSE PRACTITIONER

## 2018-09-10 PROCEDURE — 82947 ASSAY GLUCOSE BLOOD QUANT: CPT | Performed by: NURSE PRACTITIONER

## 2018-09-10 PROCEDURE — 90651 9VHPV VACCINE 2/3 DOSE IM: CPT | Performed by: NURSE PRACTITIONER

## 2018-09-10 PROCEDURE — 87340 HEPATITIS B SURFACE AG IA: CPT | Performed by: NURSE PRACTITIONER

## 2018-09-10 PROCEDURE — 87591 N.GONORRHOEAE DNA AMP PROB: CPT | Performed by: NURSE PRACTITIONER

## 2018-09-10 PROCEDURE — 99395 PREV VISIT EST AGE 18-39: CPT | Mod: 25 | Performed by: NURSE PRACTITIONER

## 2018-09-10 PROCEDURE — 90686 IIV4 VACC NO PRSV 0.5 ML IM: CPT | Performed by: NURSE PRACTITIONER

## 2018-09-10 PROCEDURE — 36415 COLL VENOUS BLD VENIPUNCTURE: CPT | Performed by: NURSE PRACTITIONER

## 2018-09-10 PROCEDURE — 90471 IMMUNIZATION ADMIN: CPT | Performed by: NURSE PRACTITIONER

## 2018-09-10 PROCEDURE — 80076 HEPATIC FUNCTION PANEL: CPT | Performed by: NURSE PRACTITIONER

## 2018-09-10 PROCEDURE — 87210 SMEAR WET MOUNT SALINE/INK: CPT | Performed by: NURSE PRACTITIONER

## 2018-09-10 PROCEDURE — 84703 CHORIONIC GONADOTROPIN ASSAY: CPT | Performed by: NURSE PRACTITIONER

## 2018-09-10 PROCEDURE — 80061 LIPID PANEL: CPT | Performed by: NURSE PRACTITIONER

## 2018-09-10 PROCEDURE — 86780 TREPONEMA PALLIDUM: CPT | Performed by: NURSE PRACTITIONER

## 2018-09-10 PROCEDURE — 81001 URINALYSIS AUTO W/SCOPE: CPT | Performed by: NURSE PRACTITIONER

## 2018-09-10 RX ORDER — METRONIDAZOLE 500 MG/1
500 TABLET ORAL 2 TIMES DAILY
Qty: 14 TABLET | Refills: 0 | Status: SHIPPED | OUTPATIENT
Start: 2018-09-10 | End: 2018-09-20

## 2018-09-10 NOTE — PATIENT INSTRUCTIONS
At [facility], we strive to deliver an exceptional experience to you, every time we see you.  If you receive a survey in the mail, please send us back your thoughts. We really do value your feedback.    Based on your medical history, these are the current health maintenance/preventive care services that you are due for (some may have been done at this visit.)  Health Maintenance Due   Topic Date Due     CHLAMYDIA SCREENING  1995     HPV IMMUNIZATION (1 of 3 - Female 3 Dose Series) 06/15/2008     HIV SCREEN (SYSTEM ASSIGNED)  06/15/2015     PEDS DTAP/TDAP (2 - Td) 04/01/2016     PHQ-2 Q1 YR  05/03/2018     PAP SCREENING Q3 YR (SYSTEM ASSIGNED)  06/15/2018     INFLUENZA VACCINE (1) 09/01/2018       Suggested websites for health information:  Www.[employer].com.[employer] : Up to date and easily searchable information on multiple topics.  Www.Finsphere.gov : medication info, interactive tutorials, watch real surgeries online  Www.familydoctor.org : good info from the Academy of Family Physicians  Www.cdc.gov : public health info, travel advisories, epidemics (H1N1)  Www.aap.org : children's health info, normal development, vaccinations  Www.health.[facility].[place].us : [place] dept of health, public health issues in [place], N1N1    Your care team:                            Family Medicine Internal Medicine   MD Waldemar Colbert MD Shantel Branch-Fleming, MD Katya Georgiev PA-C Megan Hill, APRN YOUNG Jewell MD Pediatrics   HANS Renee, MD Vanda Zavala APRN CNP   MD Ntaalia Garduno MD Deborah Mielke, MD Kim Thein, APRN CNP      Clinic hours: Monday - Thursday 7 am-7 pm; Fridays 7 am-5 pm.   Urgent care: Monday - Friday 11 am-9 pm; Saturday and Sunday 9 am-5 pm.  Pharmacy : Monday -Thursday 8 am-8 pm; Friday 8 am-6 pm; Saturday and Sunday 9 am-5 pm.     Clinic: [phone]   Pharmacy: [phone]      Preventive Health  Recommendations  Female Ages 21 to 25     Yearly exam:     See your health care provider every year in order to  o Review health changes.   o Discuss preventive care.    o Review your medicines if your doctor has prescribed any.      You should be tested each year for STDs (sexually transmitted diseases).       Talk to your provider about how often you should have cholesterol testing.      Get a Pap test every three years. If you have an abnormal result, your doctor may have you test more often.      If you are at risk for diabetes, you should have a diabetes test (fasting glucose).     Shots:     Get a flu shot each year.     Get a tetanus shot every 10 years.     Consider getting the shot (vaccine) that prevents cervical cancer (Gardasil).    Nutrition:     Eat at least 5 servings of fruits and vegetables each day.    Eat whole-grain bread, whole-wheat pasta and brown rice instead of white grains and rice.    Get adequate Calcium and Vitamin D.     Lifestyle    Exercise at least 150 minutes a week each week (30 minutes a day, 5 days a week). This will help you control your weight and prevent disease.    Limit alcohol to one drink per day.    No smoking.     Wear sunscreen to prevent skin cancer.    See your dentist every six months for an exam and cleaning.

## 2018-09-10 NOTE — MR AVS SNAPSHOT
After Visit Summary   9/10/2018    Zainab Sykes    MRN: [id number]           Patient Information     Date Of Birth          1995        Visit Information        Provider Department      9/10/2018 11:40 AM Jyotsna Nava APRN CNP [facility]        Today's Diagnoses     Routine history and physical examination of adult    -  1    Metrorrhagia        Screening for malignant neoplasm of cervix        Screen for STD (sexually transmitted disease)        CARDIOVASCULAR SCREENING; LDL GOAL LESS THAN 160        Need for HPV vaccine        Need for influenza vaccination          Care Instructions    At [facility], we strive to deliver an exceptional experience to you, every time we see you.  If you receive a survey in the mail, please send us back your thoughts. We really do value your feedback.    Based on your medical history, these are the current health maintenance/preventive care services that you are due for (some may have been done at this visit.)  Health Maintenance Due   Topic Date Due     CHLAMYDIA SCREENING  1995     HPV IMMUNIZATION (1 of 3 - Female 3 Dose Series) 06/15/2008     HIV SCREEN (SYSTEM ASSIGNED)  06/15/2015     PEDS DTAP/TDAP (2 - Td) 04/01/2016     PHQ-2 Q1 YR  05/03/2018     PAP SCREENING Q3 YR (SYSTEM ASSIGNED)  06/15/2018     INFLUENZA VACCINE (1) 09/01/2018       Suggested websites for health information:  Www.hubbuzz.com.ArthaYantra : Up to date and easily searchable information on multiple topics.  Www.medlineplus.gov : medication info, interactive tutorials, watch real surgeries online  Www.familydoctor.org : good info from the Academy of Family Physicians  Www.cdc.gov : public health info, travel advisories, epidemics (H1N1)  Www.aap.org : children's health info, normal development, vaccinations  Www.health.state.[place].us : [place] dept of health, public health issues in [place], N1N1    Your care team:                            Family  Medicine Internal Medicine   MD Waldemar Colbert MD Shantel Branch-Fleming, MD Katya Georgiev PA-C Megan Hill, APRN CNP    Harrison Jewell MD Pediatrics   HANS Renee, MD Vanda Zavala APRN CNP   MD Natalia Garduno MD Deborah Mielke, MD Kim Thein, APRN CNP      Clinic hours: Monday - Thursday 7 am-7 pm; Fridays 7 am-5 pm.   Urgent care: Monday - Friday 11 am-9 pm; Saturday and Sunday 9 am-5 pm.  Pharmacy : Monday -Thursday 8 am-8 pm; Friday 8 am-6 pm; Saturday and Sunday 9 am-5 pm.     Clinic: [phone]   Pharmacy: [phone]      Preventive Health Recommendations  Female Ages 21 to 25     Yearly exam:     See your health care provider every year in order to  o Review health changes.   o Discuss preventive care.    o Review your medicines if your doctor has prescribed any.      You should be tested each year for STDs (sexually transmitted diseases).       Talk to your provider about how often you should have cholesterol testing.      Get a Pap test every three years. If you have an abnormal result, your doctor may have you test more often.      If you are at risk for diabetes, you should have a diabetes test (fasting glucose).     Shots:     Get a flu shot each year.     Get a tetanus shot every 10 years.     Consider getting the shot (vaccine) that prevents cervical cancer (Gardasil).    Nutrition:     Eat at least 5 servings of fruits and vegetables each day.    Eat whole-grain bread, whole-wheat pasta and brown rice instead of white grains and rice.    Get adequate Calcium and Vitamin D.     Lifestyle    Exercise at least 150 minutes a week each week (30 minutes a day, 5 days a week). This will help you control your weight and prevent disease.    Limit alcohol to one drink per day.    No smoking.     Wear sunscreen to prevent skin cancer.    See your dentist every six months for an exam and cleaning.          Follow-ups after your  "visit        Future tests that were ordered for you today     Open Future Orders        Priority Expected Expires Ordered    **Hepatitis C Screen Reflex to RNA FUTURE anytime Routine 9/10/2018 9/10/2019 9/10/2018            Who to contact     If you have questions or need follow up information about today's clinic visit or your schedule please contact [facility] directly at [phone].  Normal or non-critical lab and imaging results will be communicated to you by [employer], letter or phone within 4 business days after the clinic has received the results. If you do not hear from us within 7 days, please contact the clinic through Fabt or phone. If you have a critical or abnormal lab result, we will notify you by phone as soon as possible.  Submit refill requests through JumpMusic or call your pharmacy and they will forward the refill request to us. Please allow 3 business days for your refill to be completed.          Additional Information About Your Visit        [employer] Information     [employer] gives you secure access to your electronic health record. If you see a primary care provider, you can also send messages to your care team and make appointments. If you have questions, please call your primary care clinic.  If you do not have a primary care provider, please call [phone] and they will assist you.        Care EveryWhere ID     This is your Care EveryWhere ID. This could be used by other organizations to access your [place] medical records  [id number]        Your Vitals Were     Pulse Temperature Height Last Period Pulse Oximetry BMI (Body Mass Index)    80 98  F (36.7  C) (Oral) 5' 3\" (1.6 m) (LMP Unknown) 98% 20.23 kg/m2       Blood Pressure from Last 3 Encounters:   09/10/18 122/72   05/03/17 111/68   04/28/17 123/69    Weight from Last 3 Encounters:   09/10/18 114 lb 3.2 oz (51.8 kg)   05/03/17 112 lb 9.6 oz (51.1 kg) (19 %)*   04/28/17 113 lb 12.8 oz (51.6 kg) (22 %)*     * " Growth percentiles are based on [facility] 2-20 Years data.              We Performed the Following          ADMIN VACCINE, FIRST [[id number]]     Beta HCG Qual, Urine - [facility] and [place] ([id number])     C HUMAN PAPILLOMA VIRUS VACCINE (GARDASIL 9) 3 DOSE IM     CHLAMYDIA TRACHOMATIS PCR     Glucose     Hemoglobin     Hepatic panel     Hepatitis B surface antigen     HIV Screening     Lipid Profile (Chol, Trig, HDL, LDL calc)     NEISSERIA GONORRHOEA PCR     Pap imaged thin layer screen only - recommended age 21 - 24 years     Treponema Abs w Reflex to RPR and Titer     UA with Microscopic reflex to Culture     Wet prep          Today's Medication Changes          These changes are accurate as of 9/10/18 12:25 PM.  If you have any questions, ask your nurse or doctor.               Stop taking these medicines if you haven't already. Please contact your care team if you have questions.     amoxicillin 875 MG tablet   Commonly known as:  AMOXIL   Stopped by:  Jyotsna Nava, RONY CNP                    Primary Care Provider    Provider Not In System                Equal Access to Services     [facility] : Hadii farhan marcelino hadasho Soneenaali, waaxda luqadaha, qaybta kaalmada adeegyada, waxay chasidy cruz . So [facility] [phone].    ATENCIÓN: Si habla español, tiene a awad disposición servicios gratuitos de asistencia lingüística. Llame al [phone].    We comply with applicable federal civil rights laws and [place] laws. We do not discriminate on the basis of race, color, national origin, age, disability, sex, sexual orientation, or gender identity.            Thank you!     Thank you for choosing [facility]  for your care. Our goal is always to provide you with excellent care. Hearing back from our patients is one way we can continue to improve our services. Please take a few minutes to complete the written survey that you may receive in the mail after your visit with us. Thank you!              Your Updated Medication List - Protect others around you: Learn how to safely use, store and throw away your medicines at www.disposemymeds.org.          This list is accurate as of 9/10/18 12:25 PM.  Always use your most recent med list.                   Brand Name Dispense Instructions for use Diagnosis    CLARITIN 10 MG tablet   Generic drug:  loratadine      Take 10 mg by mouth daily Reported on 5/3/2017        etonogestrel 68 MG Impl    IMPLANON/NEXPLANON     1 each by Subdermal route once Reported on 5/3/2017        HYDROcodone-acetaminophen 5-325 MG per tablet    NORCO    20 tablet    Take 1-2 tablets by mouth every 6 hours as needed for moderate to severe pain maximum 8 tablet(s) per day    Rib pain       ibuprofen 200 MG tablet    ADVIL/MOTRIN     Take 200 mg by mouth every 4 hours as needed Reported on 5/3/2017        TYLENOL 325 MG tablet   Generic drug:  acetaminophen      Take 1-2 tablets by mouth every 6 hours as needed Reported on 5/3/2017

## 2018-09-10 NOTE — PROGRESS NOTES

## 2018-09-10 NOTE — PROGRESS NOTES
SUBJECTIVE:   CC: Zainab Sykes is an 23 year old woman who presents for preventive health visit.     Healthy Habits:    Do you get at least three servings of calcium containing foods daily (dairy, green leafy vegetables, etc.)? yes    Amount of exercise or daily activities, outside of work: 7 day(s) per week    Problems taking medications regularly No    Medication side effects: No    Have you had an eye exam in the past two years? no    Do you see a dentist twice per year? yes    Do you have sleep apnea, excessive snoring or daytime drowsiness?yes, daytime drowsiness      Patient is newly single, is concerned for possible STI exposure.  She has Nexplanon and is spotting more regularly  For the last 6 weeks, no pelvic pain, fever, chills, urinary frequency, urgency, dysuria, low back or flank pain. She's had chlamydia X 2 once in 2015 and again 4/2018 (treated both times).    Today's PHQ-2 Score:   PHQ-2 ( 1999 Pfizer) 5/3/2017 3/4/2016   Q1: Little interest or pleasure in doing things 0 0   Q2: Feeling down, depressed or hopeless 0 0   PHQ-2 Score 0 0       Abuse: Current or Past(Physical, Sexual or Emotional)- No  Do you feel safe in your environment - Yes    Social History   Substance Use Topics     Smoking status: Former Smoker     Quit date: 9/10/2017     Smokeless tobacco: Never Used     Alcohol use No     If you drink alcohol do you typically have >3 drinks per day or >7 drinks per week? No                     Reviewed orders with patient.  Reviewed health maintenance and updated orders accordingly - Yes  Labs reviewed in EPIC  BP Readings from Last 3 Encounters:   09/10/18 122/72   05/03/17 111/68   04/28/17 123/69    Wt Readings from Last 3 Encounters:   09/10/18 114 lb 3.2 oz (51.8 kg)   05/03/17 112 lb 9.6 oz (51.1 kg) (19 %)*   04/28/17 113 lb 12.8 oz (51.6 kg) (22 %)*     * Growth percentiles are based on CDC 2-20 Years data.                  Patient Active Problem List   Diagnosis      Palpitations     Constipation     History reviewed. No pertinent surgical history.    Social History   Substance Use Topics     Smoking status: Former Smoker     Quit date: 9/10/2017     Smokeless tobacco: Never Used     Alcohol use No     Family History   Problem Relation Age of Onset     Diabetes Paternal Grandfather      Coronary Artery Disease Paternal Grandfather      Hypertension Paternal Grandfather      Hyperlipidemia Paternal Grandfather      Hypertension Father      Hyperlipidemia Father      Cerebrovascular Disease Father      Leukemia Father      CLL     Breast Cancer Father      Colon Cancer Father      Chronic Obstructive Pulmonary Disease Other      Leukemia Maternal Aunt      X2     Ovarian Cancer Maternal Aunt      Depression Brother      Thyroid Disease Mother      Substance Abuse No family hx of      Asthma No family hx of            Mammogram not appropriate for this patient based on age.    Pertinent mammograms are reviewed under the imaging tab.  History of abnormal Pap smear: NO - age 21-29 PAP every 3 years recommended     Reviewed and updated as needed this visit by clinical staff  Tobacco  Allergies  Meds  Med Hx  Surg Hx  Fam Hx  Soc Hx        Reviewed and updated as needed this visit by Provider        Past Medical History:   Diagnosis Date     Anxiety and depression       History reviewed. No pertinent surgical history.    ROS:  CONSTITUTIONAL: NEGATIVE for fever, chills, change in weight  INTEGUMENTARU/SKIN: NEGATIVE for worrisome rashes, moles or lesions  EYES: NEGATIVE for vision changes or irritation  ENT: NEGATIVE for ear, mouth and throat problems  RESP: NEGATIVE for significant cough or SOB  BREAST: NEGATIVE for masses, tenderness or discharge  CV: NEGATIVE for chest pain, palpitations or peripheral edema  GI: NEGATIVE for nausea, abdominal pain, heartburn, or change in bowel habits  : NEGATIVE for unusual urinary or vaginal symptoms. Periods are irregular/using Nexplanon  "for contraception  MUSCULOSKELETAL: NEGATIVE for significant arthralgias or myalgia  NEURO: NEGATIVE for weakness, dizziness or paresthesias  PSYCHIATRIC: NEGATIVE for changes in mood or affect    OBJECTIVE:   /72 (BP Location: Left arm, Patient Position: Chair, Cuff Size: Adult Regular)  Pulse 80  Temp 98  F (36.7  C) (Oral)  Ht 5' 3\" (1.6 m)  Wt 114 lb 3.2 oz (51.8 kg)  LMP  (LMP Unknown)  SpO2 98%  BMI 20.23 kg/m2  EXAM:  GENERAL: healthy, alert and no distress  EYES: Eyes grossly normal to inspection, PERRL and conjunctivae and sclerae normal  HENT: ear canals and TM's normal, nose and mouth without ulcers or lesions  NECK: no adenopathy, no asymmetry, masses, or scars and thyroid normal to palpation  RESP: lungs clear to auscultation - no rales, rhonchi or wheezes  BREAST: normal without masses, tenderness or nipple discharge and no palpable axillary masses or adenopathy  CV: regular rate and rhythm, normal S1 S2, no S3 or S4, no murmur, click or rub, no peripheral edema and peripheral pulses strong  ABDOMEN: soft, nontender, no hepatosplenomegaly, no masses and bowel sounds normal   (female): normal female external genitalia, normal urethral meatus, vaginal mucosa pink, moist, well rugated, and normal cervix/adnexa/uterus without masses or discharge  MS: no gross musculoskeletal defects noted, no edema  SKIN: no suspicious lesions or rashes  NEURO: Normal strength and tone, mentation intact and speech normal  PSYCH: mentation appears normal, affect normal/bright  LYMPH: no cervical, supraclavicular, axillary, or inguinal adenopathy    Diagnostic Test Results:  No results found for this or any previous visit (from the past 24 hour(s)).    ASSESSMENT/PLAN:   1. Routine history and physical examination of adult      2. Metrorrhagia  Checking labs, safer sex practices reviewed.  - Beta HCG Qual, Urine - [facility] and [place] ([id number])  - Hemoglobin    3. Screening for malignant neoplasm of cervix    - " "Pap imaged thin layer screen only - recommended age 21 - 24 years    4. Screen for STD (sexually transmitted disease)  Always use condoms to help prevent STI's.  - NEISSERIA GONORRHOEA PCR  - CHLAMYDIA TRACHOMATIS PCR  - HIV Screening  - Hepatic panel  - UA with Microscopic reflex to Culture  - Wet prep  - Hepatitis B surface antigen  - **Hepatitis C Screen Reflex to RNA FUTURE anytime; Future  - Treponema Abs w Reflex to RPR and Titer    5. CARDIOVASCULAR SCREENING; LDL GOAL LESS THAN 160  -Lipid panel    6. Need for HPV vaccine    - C HUMAN PAPILLOMA VIRUS VACCINE (GARDASIL 9) 3 DOSE IM  -      ADMIN VACCINE, FIRST [[id number]]    7. Need for influenza vaccination        COUNSELING:   Reviewed preventive health counseling, as reflected in patient instructions       Regular exercise       Healthy diet/nutrition       Immunizations    Vaccinated for: Human Papillomavirus and Influenza             Contraception       Safe sex practices/STD prevention       HIV screeninx in teen years, 1x in adult years, and at intervals if high risk    BP Readings from Last 1 Encounters:   09/10/18 122/72     Estimated body mass index is 20.23 kg/(m^2) as calculated from the following:    Height as of this encounter: 5' 3\" (1.6 m).    Weight as of this encounter: 114 lb 3.2 oz (51.8 kg).    BP Screening:   Last 3 BP Readings:    BP Readings from Last 3 Encounters:   09/10/18 122/72   17 111/68   17 123/69       The following was recommended to the patient:  Re-screen BP within a year and recommended lifestyle modifications       reports that she quit smoking about a year ago. She has never used smokeless tobacco.      Counseling Resources:  ATP IV Guidelines  Pooled Cohorts Equation Calculator  Breast Cancer Risk Calculator  FRAX Risk Assessment  ICSI Preventive Guidelines  Dietary Guidelines for Americans, 2010  USDA's MyPlate  ASA Prophylaxis  Lung CA Screening    RONY Damon CNP  [facility] [place]

## 2018-09-10 NOTE — NURSING NOTE
Screening Questionnaire for Adult Immunization    Are you sick today?   No   Do you have allergies to medications, food, a vaccine component or latex?   No   Have you ever had a serious reaction after receiving a vaccination?   No   Do you have a long-term health problem with heart disease, lung disease, asthma, kidney disease, metabolic disease (e.g. diabetes), anemia, or other blood disorder?   No   Do you have cancer, leukemia, HIV/AIDS, or any other immune system problem?   No   In the past 3 months, have you taken medications that affect  your immune system, such as prednisone, other steroids, or anticancer drugs; drugs for the treatment of rheumatoid arthritis, Crohn s disease, or psoriasis; or have you had radiation treatments?   No   Have you had a seizure, or a brain or other nervous system problem?   No   During the past year, have you received a transfusion of blood or blood     products, or been given immune (gamma) globulin or antiviral drug?   No   For women: Are you pregnant or is there a chance you could become        pregnant during the next month?   No   Have you received any vaccinations in the past 4 weeks?   No     Immunization questionnaire answers were all negative.        Per orders of Dr. Nava, injection of HVP9 given by Mag Jimenes. Patient instructed to remain in clinic for 15 minutes afterwards, and to report any adverse reaction to me immediately.       Screening performed by Mag Jimenes on 9/10/2018 at 12:27 PM.

## 2018-09-11 LAB
C TRACH DNA SPEC QL NAA+PROBE: POSITIVE
HBV SURFACE AG SERPL QL IA: NONREACTIVE
HIV 1+2 AB+HIV1 P24 AG SERPL QL IA: NONREACTIVE
N GONORRHOEA DNA SPEC QL NAA+PROBE: NEGATIVE
SPECIMEN SOURCE: ABNORMAL
SPECIMEN SOURCE: NORMAL
T PALLIDUM AB SER QL: NONREACTIVE

## 2018-09-12 ENCOUNTER — TELEPHONE (OUTPATIENT)
Dept: FAMILY MEDICINE | Facility: CLINIC | Age: 23
End: 2018-09-12

## 2018-09-12 RX ORDER — AZITHROMYCIN 500 MG/1
1000 TABLET, FILM COATED ORAL ONCE
Qty: 2 TABLET | Refills: 0 | Status: SHIPPED | OUTPATIENT
Start: 2018-09-12 | End: 2018-09-12

## 2018-09-12 NOTE — TELEPHONE ENCOUNTER
Notes Recorded by Jyotsna Nava APRN CNP on 9/12/2018 at 7:45 AM  Pls call patient and notify her that she has Chlamydia.  This is a sexually transmitted disease and her partner will need to be tested/treated.  I have placed a prescription for Zithromax for her at the pharmacy and she is to take it all at once.  It may make her a bit nauseated but it's important that she takes it.  She is to avoid sexual contact for 7 days beyond when both she and her partner have been treated. She does not need to come in for test of cure sampling unless she continues to be symptomatic.  Jytosna URIBE, CNP      This writer attempted to contact Zainab on 09/12/18      Reason for call abnormal results and left message.      If patient calls back:   Patient contacted by a Registered Nurse. Inform patient that someone from the RN group will contact them, document that pt called and route to P DYAD 3 RN POOL [[id number]]    Armani Walsh RN, BSN

## 2018-09-12 NOTE — TELEPHONE ENCOUNTER
Form filled out and faxed to [facility].     Faxed Form September 12, 2018 to fax number [phone]    Right Fax confirmed at 1200 PM      Armani Walsh RN, BSN

## 2018-09-12 NOTE — TELEPHONE ENCOUNTER
Patient called back and was given message and results. Discussed her finishing the Flagyl for bacterial vaginal infection and taking the azithromycin with food to try and decrease any nausea side effects from the medication. Patient had no further questions or concerns at this time.     Armani Walsh RN, BSN

## 2018-09-13 ENCOUNTER — TELEPHONE (OUTPATIENT)
Dept: FAMILY MEDICINE | Facility: CLINIC | Age: 23
End: 2018-09-13

## 2018-09-13 DIAGNOSIS — R31.9 HEMATURIA, UNSPECIFIED TYPE: Primary | ICD-10-CM

## 2018-09-13 DIAGNOSIS — Z11.3 SCREEN FOR STD (SEXUALLY TRANSMITTED DISEASE): ICD-10-CM

## 2018-09-13 DIAGNOSIS — R31.9 HEMATURIA, UNSPECIFIED TYPE: ICD-10-CM

## 2018-09-13 LAB
ALBUMIN UR-MCNC: NEGATIVE MG/DL
APPEARANCE UR: CLEAR
BILIRUB UR QL STRIP: NEGATIVE
COLOR UR AUTO: YELLOW
COPATH REPORT: NORMAL
GLUCOSE UR STRIP-MCNC: NEGATIVE MG/DL
HGB UR QL STRIP: ABNORMAL
KETONES UR STRIP-MCNC: NEGATIVE MG/DL
LEUKOCYTE ESTERASE UR QL STRIP: ABNORMAL
NITRATE UR QL: NEGATIVE
PAP: NORMAL
PH UR STRIP: 7 PH (ref 5–7)
RBC #/AREA URNS AUTO: ABNORMAL /HPF
SOURCE: ABNORMAL
SP GR UR STRIP: 1.01 (ref 1–1.03)
UROBILINOGEN UR STRIP-ACNC: 0.2 EU/DL (ref 0.2–1)
WBC #/AREA URNS AUTO: ABNORMAL /HPF

## 2018-09-13 PROCEDURE — 36415 COLL VENOUS BLD VENIPUNCTURE: CPT | Performed by: NURSE PRACTITIONER

## 2018-09-13 PROCEDURE — 87086 URINE CULTURE/COLONY COUNT: CPT | Performed by: NURSE PRACTITIONER

## 2018-09-13 PROCEDURE — 81001 URINALYSIS AUTO W/SCOPE: CPT | Performed by: NURSE PRACTITIONER

## 2018-09-13 PROCEDURE — 86803 HEPATITIS C AB TEST: CPT | Performed by: NURSE PRACTITIONER

## 2018-09-13 NOTE — TELEPHONE ENCOUNTER
Patient stating that since she started taking Flagyl noted blood in the urine of dark brown color.  Started Flagyl on 9/11.  Patient has some low abdominal pain.   Patient denies pain with urination or other symptoms.    Please review and advise when able.  Tabitha Richards RN

## 2018-09-13 NOTE — TELEPHONE ENCOUNTER
Reason for Call:  Other call back    Detailed comments: patient is requesting a call back to discuss lab results. Patient has some additional questions.     Phone Number Patient can be reached at: Cell number on file:    Telephone Information:   Mobile [phone]       Best Time: anytime     Can we leave a detailed message on this number? YES    Call taken on 9/13/2018 at 9:11 AM by Niki Lujan

## 2018-09-13 NOTE — TELEPHONE ENCOUNTER
"Patient states that her urine has some blood in it since she started taking the flagyl.  She denies vaginal bleeding, notes genital \"heaviness and throbbing.\"  No urinary frequency, urgency, dysuria, she has mild suprapubic pain but no flank or back pain. No vaginal discharge or odor.  Getting repeat UA today.  Jyotsna URIBE, CNP    "

## 2018-09-13 NOTE — TELEPHONE ENCOUNTER
[employer] message sent to patient at 6:13pm    Hi [place],    Your urine does not show a urinary tract infection.  You've had some microscopic blood in your urine for the past 2 years.  Since we just treated you for the Chlamydia and bacterial vaginosis, I'd like to have you finish the flagyl and we can repeat your urine then to follow up on the urinary symptoms.  I did add a urine culture to your specimen today so will have your results on Monday morning.    Feel free to contact me with any questions or concerns.  Thank you for allowing me to participate in your care.    Jyotsna URIBE, CNP

## 2018-09-14 LAB
BACTERIA SPEC CULT: NO GROWTH
HCV AB SERPL QL IA: NONREACTIVE
SPECIMEN SOURCE: NORMAL

## 2018-09-20 ENCOUNTER — OFFICE VISIT (OUTPATIENT)
Dept: FAMILY MEDICINE | Facility: CLINIC | Age: 23
End: 2018-09-20
Payer: COMMERCIAL

## 2018-09-20 VITALS
SYSTOLIC BLOOD PRESSURE: 103 MMHG | TEMPERATURE: 96.9 F | OXYGEN SATURATION: 100 % | WEIGHT: 113.6 LBS | BODY MASS INDEX: 20.13 KG/M2 | HEART RATE: 81 BPM | HEIGHT: 63 IN | DIASTOLIC BLOOD PRESSURE: 70 MMHG

## 2018-09-20 DIAGNOSIS — Z09 FOLLOW-UP VISIT AFTER COMPLETION OF TREATMENT: Primary | ICD-10-CM

## 2018-09-20 DIAGNOSIS — R31.9 HEMATURIA, UNSPECIFIED TYPE: ICD-10-CM

## 2018-09-20 LAB
ALBUMIN UR-MCNC: NEGATIVE MG/DL
APPEARANCE UR: CLEAR
BACTERIA #/AREA URNS HPF: ABNORMAL /HPF
BILIRUB UR QL STRIP: NEGATIVE
COLOR UR AUTO: YELLOW
GLUCOSE UR STRIP-MCNC: NEGATIVE MG/DL
HGB UR QL STRIP: ABNORMAL
KETONES UR STRIP-MCNC: NEGATIVE MG/DL
LEUKOCYTE ESTERASE UR QL STRIP: NEGATIVE
NITRATE UR QL: NEGATIVE
NON-SQ EPI CELLS #/AREA URNS LPF: ABNORMAL /LPF
PH UR STRIP: 6.5 PH (ref 5–7)
RBC #/AREA URNS AUTO: ABNORMAL /HPF
SOURCE: ABNORMAL
SP GR UR STRIP: 1.01 (ref 1–1.03)
SPECIMEN SOURCE: NORMAL
UROBILINOGEN UR STRIP-ACNC: 0.2 EU/DL (ref 0.2–1)
WBC #/AREA URNS AUTO: ABNORMAL /HPF
WET PREP SPEC: NORMAL

## 2018-09-20 PROCEDURE — 81001 URINALYSIS AUTO W/SCOPE: CPT | Performed by: NURSE PRACTITIONER

## 2018-09-20 PROCEDURE — 99213 OFFICE O/P EST LOW 20 MIN: CPT | Performed by: NURSE PRACTITIONER

## 2018-09-20 PROCEDURE — 87210 SMEAR WET MOUNT SALINE/INK: CPT | Performed by: NURSE PRACTITIONER

## 2018-09-20 NOTE — PROGRESS NOTES
SUBJECTIVE:   Zainab Sykes is a 23 year old female who presents to clinic today for the following health issues:    Follow up for BV and Chlamydia, no new symptoms and medication is all gone.  Patient has recently broken up with her boyfriend who has reportedly given her Chlamydia 3 times- she is doing OK with this.    Problem list and histories reviewed & adjusted, as indicated.  Additional history: as documented    Patient Active Problem List   Diagnosis     Palpitations     Constipation     History reviewed. No pertinent surgical history.    Social History   Substance Use Topics     Smoking status: Former Smoker     Quit date: 9/10/2017     Smokeless tobacco: Never Used     Alcohol use No     Family History   Problem Relation Age of Onset     Diabetes Paternal Grandfather      Coronary Artery Disease Paternal Grandfather      Hypertension Paternal Grandfather      Hyperlipidemia Paternal Grandfather      Hypertension Father      Hyperlipidemia Father      Cerebrovascular Disease Father      Leukemia Father      CLL     Breast Cancer Father      Colon Cancer Father      Chronic Obstructive Pulmonary Disease Other      Leukemia Maternal Aunt      X2     Ovarian Cancer Maternal Aunt      Depression Brother      Thyroid Disease Mother      Substance Abuse No family hx of      Asthma No family hx of          Current Outpatient Prescriptions   Medication Sig Dispense Refill     acetaminophen (TYLENOL) 325 MG tablet Take 1-2 tablets by mouth every 6 hours as needed Reported on 5/3/2017       etonogestrel (IMPLANON/NEXPLANON) 68 MG IMPL 1 each by Subdermal route once Reported on 5/3/2017       HYDROcodone-acetaminophen (NORCO) 5-325 MG per tablet Take 1-2 tablets by mouth every 6 hours as needed for moderate to severe pain maximum 8 tablet(s) per day 20 tablet 0     ibuprofen (ADVIL,MOTRIN) 200 MG tablet Take 200 mg by mouth every 4 hours as needed Reported on 5/3/2017       loratadine (CLARITIN) 10 MG tablet Take  "10 mg by mouth daily Reported on 5/3/2017       BP Readings from Last 3 Encounters:   09/20/18 103/70   09/10/18 122/72   05/03/17 111/68    Wt Readings from Last 3 Encounters:   09/20/18 113 lb 9.6 oz (51.5 kg)   09/10/18 114 lb 3.2 oz (51.8 kg)   05/03/17 112 lb 9.6 oz (51.1 kg) (19 %)*     * Growth percentiles are based on CDC 2-20 Years data.                    Reviewed and updated as needed this visit by clinical staff  Tobacco  Allergies  Meds  Med Hx  Surg Hx  Fam Hx  Soc Hx      Reviewed and updated as needed this visit by Provider         ROS:  Constitutional, HEENT, cardiovascular, pulmonary, gi and gu systems are negative, except as otherwise noted.    OBJECTIVE:     /70 (BP Location: Left arm, Patient Position: Chair, Cuff Size: Adult Regular)  Pulse 81  Temp 96.9  F (36.1  C) (Tympanic)  Ht 5' 3\" (1.6 m)  Wt 113 lb 9.6 oz (51.5 kg)  LMP  (LMP Unknown)  SpO2 100%  BMI 20.12 kg/m2  Body mass index is 20.12 kg/(m^2).  GENERAL: healthy, alert and no distress  EYES: Eyes grossly normal to inspection, PERRL and conjunctivae and sclerae normal  HENT: ear canals and TM's normal, nose and mouth without ulcers or lesions  NECK: no adenopathy, no asymmetry, masses, or scars and thyroid normal to palpation  RESP: lungs clear to auscultation - no rales, rhonchi or wheezes  CV: regular rate and rhythm, normal S1 S2, no S3 or S4, no murmur, click or rub, no peripheral edema and peripheral pulses strong  ABDOMEN: soft, nontender, no hepatosplenomegaly, no masses and bowel sounds normal  MS: no gross musculoskeletal defects noted, no edema  SKIN: no suspicious lesions or rashes  NEURO: Normal strength and tone, mentation intact and speech normal  PSYCH: mentation appears normal, affect normal/bright    Diagnostic Test Results:  Results for orders placed or performed in visit on 09/20/18 (from the past 24 hour(s))   UA reflex to Microscopic and Culture   Result Value Ref Range    Color Urine Yellow     " "Appearance Urine Clear     Glucose Urine Negative NEG^Negative mg/dL    Bilirubin Urine Negative NEG^Negative    Ketones Urine Negative NEG^Negative mg/dL    Specific Gravity Urine 1.015 1.003 - 1.035    Blood Urine Small (A) NEG^Negative    pH Urine 6.5 5.0 - 7.0 pH    Protein Albumin Urine Negative NEG^Negative mg/dL    Urobilinogen Urine 0.2 0.2 - 1.0 EU/dL    Nitrite Urine Negative NEG^Negative    Leukocyte Esterase Urine Negative NEG^Negative    Source Midstream Urine    Urine Microscopic   Result Value Ref Range    WBC Urine 0 - 5 OTO5^0 - 5 /HPF    RBC Urine 2-5 (A) OTO2^O - 2 /HPF    Squamous Epithelial /LPF Urine Few FEW^Few /LPF    Bacteria Urine Few (A) NEG^Negative /HPF   Wet prep   Result Value Ref Range    Specimen Description Vagina     Wet Prep No yeast seen     Wet Prep No clue cells seen     Wet Prep No Trichomonas seen        ASSESSMENT/PLAN:         BMI:   Estimated body mass index is 20.12 kg/(m^2) as calculated from the following:    Height as of this encounter: 5' 3\" (1.6 m).    Weight as of this encounter: 113 lb 9.6 oz (51.5 kg).         1. Follow-up visit after completion of treatment  UA - small blood and 2-5 RBC's on microscopic today.  Discussed condom use in detail.  - UA reflex to Microscopic and Culture  - Wet prep  - Urine Microscopic    2. Hematuria, unspecified type  Repeat UA today. Consider Urology referral.  - UA reflex to Microscopic and Culture    See Patient Instructions    RONY Damon [facility]  "

## 2018-09-20 NOTE — PATIENT INSTRUCTIONS
At [facility], we strive to deliver an exceptional experience to you, every time we see you.  If you receive a survey in the mail, please send us back your thoughts. We really do value your feedback.    Based on your medical history, these are the current health maintenance/preventive care services that you are due for (some may have been done at this visit.)  Health Maintenance Due   Topic Date Due     PEDS DTAP/TDAP (2 - Td) 04/01/2016       Suggested websites for health information:  Www.iExplore.[employer]iq Micro : Up to date and easily searchable information on multiple topics.  Www.[employer].gov : medication info, interactive tutorials, watch real surgeries online  Www.familydoctor.org : good info from the Academy of Family Physicians  Www.cdc.gov : public health info, travel advisories, epidemics (H1N1)  Www.aap.org : children's health info, normal development, vaccinations  Www.health.[facility].[place].us : [place] dept of health, public health issues in [place], N1N1    Your care team:                            Family Medicine Internal Medicine   MD Waldemar Colbert MD Shantel Branch-Fleming, MD Katya Georgiev PA-C Megan Hill, APRN CNP    Harrison Jewell MD Pediatrics   Elias Mahajan, PAAHMET Alvares, CNP MD Vanda Stewart APRN CNP   MD Natalia Garduno MD Deborah Mielke, MD Kim Thein, APRN [facility]      Clinic hours: Monday - Thursday 7 am-7 pm; Fridays 7 am-5 pm.   Urgent care: Monday - Friday 11 am-9 pm; Saturday and Sunday 9 am-5 pm.  Pharmacy : Monday -Thursday 8 am-8 pm; Friday 8 am-6 pm; Saturday and Sunday 9 am-5 pm.     Clinic: [phone]   Pharmacy: [phone]      If You Think You Have an STD  Treating a sexually transmitted disease (STD) early limits the problems they can cause. If you have an STD, get treated right away. Ask your partner to be tested, too. Then avoid sex until you ve finished treatment and your healthcare provider says it s OK to  have sex again.    Follow your treatment plan  Treatment depends on the type of STD you have. Common treatments include injections and oral pills or liquids. Creams and gels can be applied to sores caused by certain STDs. Follow the tips below:    Get new treatment for each new STD.    Don t use old medicine, even for the same STD. Use medicines as directed.    Don t share medicine unless instructed to do so by your healthcare provider or clinic.  Talk to your partner  If you have an STD, it s your duty to tell all your recent partners so they can be tested and treated. This is one important way to prevent the disease from being spread. Telling a partner that you have an STD can be hard. You may be embarrassed, angry, or afraid. It s often unclear who had the STD first. So try not to place blame. Your healthcare provider may offer some advice on how to begin.  Prevent future problems  Even after you ve been treated, you can still be infected again. This is a common problem. It happens when a partner passes the STD back to you. To avoid this, your partner must be tested. He or she may also need treatment. After treatment, go to any scheduled follow-up visits. Then prevent future problems with safer sex. Limit your number of partners and always use a latex condom.  Diagnosing STDS  Your healthcare provider will take a health history and examine you. During your health history, you will be asked about your sex habits and health history. You may also be asked about drug use. Give honest answers. Your healthcare provider will then check your body for signs of STDs. He or she also may perform one or more of the following tests:    Fluid is swabbed from any sores. Samples also may be taken from the vagina, penis, mouth, or rectum. The samples are then tested for STDs.    Blood or urine samples may be taken. They are checked for viruses or bacteria that cause STDs.    For women, cells from the cervix (where the vagina and  uterus meet) are checked for signs of cancer. This is called a Pap smear. If cell changes are found, a magnifying scope may be used to take a closer look (colposcopy).   Date Last Reviewed: 12/1/2016 2000-2017 [employer]. [address] [place] [address], [place] [address]. All rights reserved. This information is not intended as a substitute for professional medical care. Always follow your healthcare professional's instructions.

## 2018-09-20 NOTE — MR AVS SNAPSHOT
After Visit Summary   9/20/2018    Zainab Sykes    MRN: [id number]           Patient Information     Date Of Birth          1995        Visit Information        Provider Department      9/20/2018 11:20 AM Jyotsna Nava APRN CNP [facility]        Today's Diagnoses     Dysuria    -  1    Hematuria, unspecified type          Care Instructions    At [facility], we strive to deliver an exceptional experience to you, every time we see you.  If you receive a survey in the mail, please send us back your thoughts. We really do value your feedback.    Based on your medical history, these are the current health maintenance/preventive care services that you are due for (some may have been done at this visit.)  Health Maintenance Due   Topic Date Due     PEDS DTAP/TDAP (2 - Td) 04/01/2016       Suggested websites for health information:  WwwMultimedia Plus | QuizScore : Up to date and easily searchable information on multiple topics.  Www.[employer]Doctors.gov : medication info, interactive tutorials, watch real surgeries online  Www.familydoctor.org : good info from the Academy of Family Physicians  Www.cdc.gov : public health info, travel advisories, epidemics (H1N1)  Www.aap.org : children's health info, normal development, vaccinations  Www.health.state.[place].us : [place] dept of health, public health issues in [place], N1N1    Your care team:                            Family Medicine Internal Medicine   MD Waldemar Colbert MD Shantel Branch-Fleming, MD Katya Georgiev PA-C Megan Hill, APRN CNP Nam Ho, MD Pediatrics   HANS Renee CNP Paula Brito, MD Amelia Massimini APRN MD Natalia Garcia MD Deborah Mielke, MD Kim Thein, APRN CNP      Clinic hours: Monday - Thursday 7 am-7 pm; Fridays 7 am-5 pm.   Urgent care: Monday - Friday 11 am-9 pm; Saturday and Sunday 9 am-5 pm.  Pharmacy : Monday -Thursday 8 am-8 pm; Friday 8  am-6 pm; Saturday and Sunday 9 am-5 pm.     Clinic: [phone]   Pharmacy: [phone]      If You Think You Have an STD  Treating a sexually transmitted disease (STD) early limits the problems they can cause. If you have an STD, get treated right away. Ask your partner to be tested, too. Then avoid sex until you ve finished treatment and your healthcare provider says it s OK to have sex again.    Follow your treatment plan  Treatment depends on the type of STD you have. Common treatments include injections and oral pills or liquids. Creams and gels can be applied to sores caused by certain STDs. Follow the tips below:    Get new treatment for each new STD.    Don t use old medicine, even for the same STD. Use medicines as directed.    Don t share medicine unless instructed to do so by your healthcare provider or clinic.  Talk to your partner  If you have an STD, it s your duty to tell all your recent partners so they can be tested and treated. This is one important way to prevent the disease from being spread. Telling a partner that you have an STD can be hard. You may be embarrassed, angry, or afraid. It s often unclear who had the STD first. So try not to place blame. Your healthcare provider may offer some advice on how to begin.  Prevent future problems  Even after you ve been treated, you can still be infected again. This is a common problem. It happens when a partner passes the STD back to you. To avoid this, your partner must be tested. He or she may also need treatment. After treatment, go to any scheduled follow-up visits. Then prevent future problems with safer sex. Limit your number of partners and always use a latex condom.  Diagnosing STDS  Your healthcare provider will take a health history and examine you. During your health history, you will be asked about your sex habits and health history. You may also be asked about drug use. Give honest answers. Your healthcare provider will then check  your body for signs of STDs. He or she also may perform one or more of the following tests:    Fluid is swabbed from any sores. Samples also may be taken from the vagina, penis, mouth, or rectum. The samples are then tested for STDs.    Blood or urine samples may be taken. They are checked for viruses or bacteria that cause STDs.    For women, cells from the cervix (where the vagina and uterus meet) are checked for signs of cancer. This is called a Pap smear. If cell changes are found, a magnifying scope may be used to take a closer look (colposcopy).   Date Last Reviewed: 12/1/2016 2000-2017 [employer]. [address] [place] [address], [place] [address]. All rights reserved. This information is not intended as a substitute for professional medical care. Always follow your healthcare professional's instructions.                Follow-ups after your visit        Who to contact     If you have questions or need follow up information about today's clinic visit or your schedule please contact [facility] directly at [phone].  Normal or non-critical lab and imaging results will be communicated to you by Be-Boundhart, letter or phone within 4 business days after the clinic has received the results. If you do not hear from us within 7 days, please contact the clinic through Keterat or phone. If you have a critical or abnormal lab result, we will notify you by phone as soon as possible.  Submit refill requests through [employer] or call your pharmacy and they will forward the refill request to us. Please allow 3 business days for your refill to be completed.          Additional Information About Your Visit        [employer] Information     [employer] gives you secure access to your electronic health record. If you see a primary care provider, you can also send messages to your care team and make appointments. If you have questions, please call your primary care clinic.  If you do not have a primary care  "provider, please call [phone] and they will assist you.        Care EveryWhere ID     This is your Care EveryWhere ID. This could be used by other organizations to access your [place] medical records  [id number]        Your Vitals Were     Pulse Temperature Height Last Period Pulse Oximetry BMI (Body Mass Index)    81 96.9  F (36.1  C) (Tympanic) 5' 3\" (1.6 m) (LMP Unknown) 100% 20.12 kg/m2       Blood Pressure from Last 3 Encounters:   09/20/18 103/70   09/10/18 122/72   05/03/17 111/68    Weight from Last 3 Encounters:   09/20/18 113 lb 9.6 oz (51.5 kg)   09/10/18 114 lb 3.2 oz (51.8 kg)   05/03/17 112 lb 9.6 oz (51.1 kg) (19 %)*     * Growth percentiles are based on [facility] 2-20 Years data.              We Performed the Following     UA reflex to Microscopic and Culture     Urine Microscopic     Wet prep          Today's Medication Changes          These changes are accurate as of 9/20/18 12:05 PM.  If you have any questions, ask your nurse or doctor.               Stop taking these medicines if you haven't already. Please contact your care team if you have questions.     metroNIDAZOLE 500 MG tablet   Commonly known as:  FLAGYL   Stopped by:  Jyotsna Nava APRN CNP                    Primary Care Provider Office Phone # Fax #    RONY Fairchild [phone]       [address] [place], [place] [id number]        Equal Access to Services     [facility] HospitalJULIA AH: Hadii farhan marcelino hadasho Soneenaali, waaxda luqadaha, qaybta kaalmada anayada, eileen harmon. So [facility] [phone].    ATENCIÓN: Si habla rip, tiene a awad disposición servicios gratuitos de asistencia lingüística. Llame al [phone].    We comply with applicable federal civil rights laws and [place] laws. We do not discriminate on the basis of race, color, national origin, age, disability, sex, sexual orientation, or gender identity.            Thank you!     Thank you for choosing [facility]ACIN[facility]  for your care. Our goal is always to provide you with excellent care. Hearing back from our patients is one way we can continue to improve our services. Please take a few minutes to complete the written survey that you may receive in the mail after your visit with us. Thank you!             Your Updated Medication List - Protect others around you: Learn how to safely use, store and throw away your medicines at www.disposemymeds.org.          This list is accurate as of 9/20/18 12:05 PM.  Always use your most recent med list.                   Brand Name Dispense Instructions for use Diagnosis    CLARITIN 10 MG tablet   Generic drug:  loratadine      Take 10 mg by mouth daily Reported on 5/3/2017        etonogestrel 68 MG Impl    IMPLANON/NEXPLANON     1 each by Subdermal route once Reported on 5/3/2017        HYDROcodone-acetaminophen 5-325 MG per tablet    NORCO    20 tablet    Take 1-2 tablets by mouth every 6 hours as needed for moderate to severe pain maximum 8 tablet(s) per day    Rib pain       ibuprofen 200 MG tablet    ADVIL/MOTRIN     Take 200 mg by mouth every 4 hours as needed Reported on 5/3/2017        TYLENOL 325 MG tablet   Generic drug:  acetaminophen      Take 1-2 tablets by mouth every 6 hours as needed Reported on 5/3/2017

## 2019-01-28 ENCOUNTER — OFFICE VISIT (OUTPATIENT)
Dept: FAMILY MEDICINE | Facility: CLINIC | Age: 24
End: 2019-01-28
Payer: COMMERCIAL

## 2019-01-28 VITALS
OXYGEN SATURATION: 95 % | TEMPERATURE: 97.5 F | DIASTOLIC BLOOD PRESSURE: 56 MMHG | BODY MASS INDEX: 22.39 KG/M2 | HEIGHT: 63 IN | SYSTOLIC BLOOD PRESSURE: 99 MMHG | WEIGHT: 126.4 LBS | HEART RATE: 80 BPM

## 2019-01-28 DIAGNOSIS — Z11.3 SCREENING EXAMINATION FOR VENEREAL DISEASE: ICD-10-CM

## 2019-01-28 DIAGNOSIS — Z30.017 INSERTION OF NEXPLANON: ICD-10-CM

## 2019-01-28 DIAGNOSIS — Z30.46 ENCOUNTER FOR NEXPLANON REMOVAL: Primary | ICD-10-CM

## 2019-01-28 PROCEDURE — 11983 REMOVE/INSERT DRUG IMPLANT: CPT | Performed by: NURSE PRACTITIONER

## 2019-01-28 PROCEDURE — 99207 ZZC NO CHARGE LOS: CPT | Performed by: NURSE PRACTITIONER

## 2019-01-28 PROCEDURE — 90651 9VHPV VACCINE 2/3 DOSE IM: CPT | Performed by: NURSE PRACTITIONER

## 2019-01-28 PROCEDURE — 90471 IMMUNIZATION ADMIN: CPT | Performed by: NURSE PRACTITIONER

## 2019-01-28 ASSESSMENT — MIFFLIN-ST. JEOR: SCORE: 1307.48

## 2019-01-28 NOTE — PATIENT INSTRUCTIONS
At [facility], we strive to deliver an exceptional experience to you, every time we see you.  If you receive a survey in the mail, please send us back your thoughts. We really do value your feedback.    Your care team:                            Family Medicine Internal Medicine   MD Waldemar Colbert MD Shantel Branch-Fleming, MD Katya Georgiev PA-C Megan Hill, APRN CNP    Harrison Jewell, MD Pediatrics   Elias Mahajan, HANS Alvares, MD Vanda Zavala APRN CNP   MD Natalia Garduno MD Deborah Mielke, MD Laura Nava, APRN [facility]      Clinic hours: Monday - Thursday 7 am-7 pm; Fridays 7 am-5 pm.   Urgent care: Monday - Friday 11 am-9 pm; Saturday and Sunday 9 am-5 pm.  Pharmacy : Monday -Thursday 8 am-8 pm; Friday 8 am-6 pm; Saturday and Sunday 9 am-5 pm.     Clinic: [phone]   Pharmacy: [phone]        Patient Education     Etonogestrel implant  What is this medicine?  ETONOGESTREL (et oh maral NICOLE trel) is a contraceptive (birth control) device. It is used to prevent pregnancy. It can be used for up to 3 years.  How should I use this medicine?  This device is inserted just under the skin on the inner side of your upper arm by a health care professional.  Talk to your pediatrician regarding the use of this medicine in children. Special care may be needed.  What side effects may I notice from receiving this medicine?  Side effects that you should report to your doctor or health care professional as soon as possible:    allergic reactions like skin rash, itching or hives, swelling of the face, lips, or tongue    breast lumps    changes in emotions or moods    depressed mood    heavy or prolonged menstrual bleeding    pain, irritation, swelling, or bruising at the insertion site    scar at site of insertion    signs of infection at the insertion site such as fever, and skin redness, pain or discharge    signs of pregnancy    signs and  symptoms of a blood clot such as breathing problems; changes in vision; chest pain; severe, sudden headache; pain, swelling, warmth in the leg; trouble speaking; sudden numbness or weakness of the face, arm or leg    signs and symptoms of liver injury like dark yellow or brown urine; general ill feeling or flu-like symptoms; light-colored stools; loss of appetite; nausea; right upper belly pain; unusually weak or tired; yellowing of the eyes or skin    unusual vaginal bleeding, discharge    signs and symptoms of a stroke like changes in vision; confusion; trouble speaking or understanding; severe headaches; sudden numbness or weakness of the face, arm or leg; trouble walking; dizziness; loss of balance or coordination  Side effects that usually do not require medical attention (report to your doctor or health care professional if they continue or are bothersome):    acne    back pain    breast pain    changes in weight    dizziness    general ill feeling or flu-like symptoms    headache    irregular menstrual bleeding    nausea    sore throat    vaginal irritation or inflammation  What may interact with this medicine?  Do not take this medicine with any of the following medications:    amprenavir    bosentan    fosamprenavir  This medicine may also interact with the following medications:    barbiturate medicines for inducing sleep or treating seizures    certain medicines for fungal infections like ketoconazole and itraconazole    grapefruit juice    griseofulvin    medicines to treat seizures like carbamazepine, felbamate, oxcarbazepine, phenytoin, topiramate    modafinil    phenylbutazone    rifampin    rufinamide    some medicines to treat HIV infection like atazanavir, indinavir, lopinavir, nelfinavir, tipranavir, ritonavir    Andrea's wort  What if I miss a dose?  This does not apply.  Where should I keep my medicine?  This drug is given in a hospital or clinic and will not be stored at home.  What should I  tell my health care provider before I take this medicine?  They need to know if you have any of these conditions:    abnormal vaginal bleeding    blood vessel disease or blood clots    cancer of the breast, cervix, or liver    depression    diabetes    gallbladder disease    headaches    heart disease or recent heart attack    high blood pressure    high cholesterol    kidney disease    liver disease    renal disease    seizures    tobacco smoker    an unusual or allergic reaction to etonogestrel, other hormones, anesthetics or antiseptics, medicines, foods, dyes, or preservatives    pregnant or trying to get pregnant    breast-feeding  What should I watch for while using this medicine?  This product does not protect you against HIV infection (AIDS) or other sexually transmitted diseases.  You should be able to feel the implant by pressing your fingertips over the skin where it was inserted. Contact your doctor if you cannot feel the implant, and use a non-hormonal birth control method (such as condoms) until your doctor confirms that the implant is in place. If you feel that the implant may have broken or become bent while in your arm, contact your healthcare provider.  NOTE:This sheet is a summary. It may not cover all possible information. If you have questions about this medicine, talk to your doctor, pharmacist, or health care provider. Copyright  2018 Elsevier      Nexplanon insertion: Pt was instructed to call if bleeding, severe pain or foul smell.  Instructed to remove pressure dressing after 24 hours, then may keep insertion site covered with a bandaid until it is healed

## 2019-01-28 NOTE — PROGRESS NOTES
SUBJECTIVE:   Zainab Sykes is a 23 year old female who presents to clinic today for the following health issues:      Pt is here to get the Nexplanon removed and re-inserted. Her current Nexplanon was due to be removed on 19.  She has been using condoms, has not been sexually active for over 3 months, declines HCG screen today.    Problem list and histories reviewed & adjusted, as indicated.  Additional history: as documented    Patient Active Problem List   Diagnosis     Palpitations     Constipation     History reviewed. No pertinent surgical history.    Social History     Tobacco Use     Smoking status: Former Smoker     Last attempt to quit: 9/10/2017     Years since quittin.3     Smokeless tobacco: Never Used   Substance Use Topics     Alcohol use: No     Family History   Problem Relation Age of Onset     Diabetes Paternal Grandfather      Coronary Artery Disease Paternal Grandfather      Hypertension Paternal Grandfather      Hyperlipidemia Paternal Grandfather      Hypertension Father      Hyperlipidemia Father      Cerebrovascular Disease Father      Leukemia Father         CLL     Breast Cancer Father      Colon Cancer Father      Chronic Obstructive Pulmonary Disease Other      Leukemia Maternal Aunt         X2     Ovarian Cancer Maternal Aunt      Depression Brother      Thyroid Disease Mother      Substance Abuse No family hx of      Asthma No family hx of          Current Outpatient Medications   Medication Sig Dispense Refill     acetaminophen (TYLENOL) 325 MG tablet Take 1-2 tablets by mouth every 6 hours as needed Reported on 5/3/2017       etonogestrel (IMPLANON/NEXPLANON) 68 MG IMPL 1 each by Subdermal route once Reported on 5/3/2017       HYDROcodone-acetaminophen (NORCO) 5-325 MG per tablet Take 1-2 tablets by mouth every 6 hours as needed for moderate to severe pain maximum 8 tablet(s) per day 20 tablet 0     ibuprofen (ADVIL,MOTRIN) 200 MG tablet Take 200 mg by mouth every 4 hours  "as needed Reported on 5/3/2017       loratadine (CLARITIN) 10 MG tablet Take 10 mg by mouth daily Reported on 5/3/2017       BP Readings from Last 3 Encounters:   01/28/19 99/56   09/20/18 103/70   09/10/18 122/72    Wt Readings from Last 3 Encounters:   01/28/19 57.3 kg (126 lb 6.4 oz)   09/20/18 51.5 kg (113 lb 9.6 oz)   09/10/18 51.8 kg (114 lb 3.2 oz)                    Reviewed and updated as needed this visit by clinical staff  Tobacco  Allergies  Meds  Med Hx  Surg Hx  Fam Hx  Soc Hx      Reviewed and updated as needed this visit by Provider         ROS:  Constitutional, HEENT, cardiovascular, pulmonary, gi and gu systems are negative, except as otherwise noted.    OBJECTIVE:     BP 99/56   Pulse 80   Temp 97.5  F (36.4  C) (Oral)   Ht 1.6 m (5' 3\")   Wt 57.3 kg (126 lb 6.4 oz)   SpO2 95%   BMI 22.39 kg/m    Body mass index is 22.39 kg/m .  GENERAL: healthy, alert and no distress  EYES: Eyes grossly normal to inspection, PERRL and conjunctivae and sclerae normal  HENT: ear canals and TM's normal, nose and mouth without ulcers or lesions  NECK: no adenopathy, no asymmetry, masses, or scars and thyroid normal to palpation  RESP: lungs clear to auscultation - no rales, rhonchi or wheezes  CV: regular rate and rhythm, normal S1 S2, no S3 or S4, no murmur, click or rub, no peripheral edema and peripheral pulses strong  ABDOMEN: soft, nontender, no hepatosplenomegaly, no masses and bowel sounds normal  MS: no gross musculoskeletal defects noted, no edema  SKIN: no suspicious lesions or rashes  NEURO: Normal strength and tone, mentation intact and speech normal  BACK: no CVA tenderness, no paralumbar tenderness  PSYCH: mentation appears normal, affect normal/bright  LYMPH: normal ant/post cervical, supraclavicular nodes    Diagnostic Test Results:  No results found for this or any previous visit (from the past 24 hour(s)).    ASSESSMENT/PLAN:           BMI:   Estimated body mass index is 22.39 kg/m  as " "calculated from the following:    Height as of this encounter: 1.6 m (5' 3\").    Weight as of this encounter: 57.3 kg (126 lb 6.4 oz).         1. Encounter for Nexplanon removal  Procedure Note - Etonogestrel Implant Removal    HPI: Zainab Sykes is here for Nexplanon (etonogestrel implant) removal  Indication: Desired contraception, current Nexplanon is outdated  STI history:   History treated Chlamydia 9/2018.  Future Contraception Plans? Nexplanon3    Counselling and Consent:  Affirmation of informed consent was signed and scanned into the medical record. Risks, benefits and alternatives were discussed.   Instructed on use of condoms for STI prevention.  Patient's questions were elicited and answered.        Preoperative Diagnosis:  Nexplanon Removal  Postoperative Diagnosis:  same     Technique:   Skin prep Betadine  Anesthesia 1% lidocaine, with epi  EBL:   minimal  Complications: No  Tolerance:  Pt tolerated procedure well and was in stable condition.     Pt was positioned on exam table with left arm flexed and externally rotated. Nexplanon device was palpated without difficulty.  Anesthesia provided at the insertion site and then the area was prepped with betadine. Etonogestrel implant was then removed without difficulty using forceps.    2. Insertion of Nexplanon  Procedure Note - Etonogestrel Implant Insertion     HPI: Zainab Sykes is here for Nexplanon (etonogestrel implant) insertion.   Indication: unwanted fertility  BP 99/56   Pulse 80   Temp 97.5  F (36.4  C) (Oral)   Ht 1.6 m (5' 3\")   Wt 57.3 kg (126 lb 6.4 oz)   SpO2 95%   BMI 22.39 kg/m    Previous STD testing in the past 1 year? (if no, please send to lab for vaginal swab)    Labs: UPT not done    Prev Contraception? Nexplanon  Smoking?  No    Counselling and Consent:  Affirmation of informed consent was signed and scanned into the medical record. Risks, benefits and alternatives were discussed. Discussed potential side effects of the " etonogestrel implant including the risk of irregular bleeding that may persist across the 3 yrs of use.  Instructed on use of condoms for STI prevention.  Patient's questions were elicited and answered.        Preoperative Diagnosis:  Unwanted fertility  Postoperative Diagnosis:  same     Technique:   Skin prep Betadine  Anesthesia 1% lidocaine, with epi  EBL:   minimal  Complications: No  Tolerance:  Pt tolerated procedure well and was in stable condition.     Pt was positioned on exam table with left arm flexed and externally rotated. Area was marked for insertion 8cm frm the medial epicondyle along the sulcus between the biceps and triceps. Anesthesia provided at the insertion site and along the insertion track and then the area was prepped with betadine. Etonogestrel implant was then inserted subdermally in usual fashion. Provider and patient confirmed placement by palpating the device. Pressure dressing applied and procedure complete.     NDC 1552-8989-03, one device used.  Follow up:  Pt was instructed to call if bleeding, severe pain or foul smell.  Instructed to vinicius  ve pressure dressing after 24 hours, then may keep insertion site covered with a bandaid until it is healed.  Instructed that she requires removal or replacement of the device in 3 years.    3. Screening examination for venereal disease    - NEISSERIA GONORRHOEA PCR  - CHLAMYDIA TRACHOMATIS PCR    See Patient Instructions    RONY Damon [facility]

## 2019-01-28 NOTE — NURSING NOTE
Screening Questionnaire for Adult Immunization    Are you sick today?   No   Do you have allergies to medications, food, a vaccine component or latex?   No   Have you ever had a serious reaction after receiving a vaccination?   No   Do you have a long-term health problem with heart disease, lung disease, asthma, kidney disease, metabolic disease (e.g. diabetes), anemia, or other blood disorder?   No   Do you have cancer, leukemia, HIV/AIDS, or any other immune system problem?   No   In the past 3 months, have you taken medications that affect  your immune system, such as prednisone, other steroids, or anticancer drugs; drugs for the treatment of rheumatoid arthritis, Crohn s disease, or psoriasis; or have you had radiation treatments?   No   Have you had a seizure, or a brain or other nervous system problem?   No   During the past year, have you received a transfusion of blood or blood     products, or been given immune (gamma) globulin or antiviral drug?   No   For women: Are you pregnant or is there a chance you could become        pregnant during the next month?   No   Have you received any vaccinations in the past 4 weeks?   No     Immunization questionnaire answers were all negative.        Per orders of Laura Nava, injection of HPV given by Radha Beckman. Patient instructed to remain in clinic for 15 minutes afterwards, and to report any adverse reaction to me immediately.       Screening performed by Radha Beckman on 1/28/2019.

## 2019-04-29 ENCOUNTER — OFFICE VISIT (OUTPATIENT)
Dept: OBGYN | Facility: CLINIC | Age: 24
End: 2019-04-29
Payer: COMMERCIAL

## 2019-04-29 VITALS
WEIGHT: 119.6 LBS | DIASTOLIC BLOOD PRESSURE: 69 MMHG | SYSTOLIC BLOOD PRESSURE: 108 MMHG | OXYGEN SATURATION: 100 % | HEART RATE: 69 BPM | BODY MASS INDEX: 21.19 KG/M2

## 2019-04-29 DIAGNOSIS — Z30.46 NEXPLANON REMOVAL: Primary | ICD-10-CM

## 2019-04-29 PROCEDURE — 99202 OFFICE O/P NEW SF 15 MIN: CPT | Mod: 25 | Performed by: OBSTETRICS & GYNECOLOGY

## 2019-04-29 PROCEDURE — 11982 REMOVE DRUG IMPLANT DEVICE: CPT | Performed by: OBSTETRICS & GYNECOLOGY

## 2019-04-29 NOTE — PROGRESS NOTES
This 22 y/o female, , presents as a new patient to the [place] gyn dept for removal of her current Nexplanon even though it is not due for removal since it was just placed on 19.  She wants to be hormone-free for awhile so plans to use abstinence or condoms in the future.  She dislikes the BCP and Depo Provera, both of which she has tried in the past.  /69   Pulse 69   Wt 54.3 kg (119 lb 9.6 oz)   SpO2 100%   Breastfeeding? No   BMI 21.19 kg/m    Informed consent was reviewed and obtained for Nexplanon removal  She lay down in supine fashion with her left arm flexed at the elbow with her hand near her ear.  I was able to easily feel the Nexplanon implant just beneath the skin so the exit caroline was made with a purple pen and the site was cleansed with betadine x 3 swabs followed by an alcohol pad.  1% lidocaine was used for local anesthesia and 2 ccs was injected with spillage, will care not to inject the purple marking.  Next, after several minutes, the site was tested and found to be numb.  A stab wound was made near the exit caroline and the Nexplanon vasyl was removed with a small amount of difficulty due to scar tissue encasement.  The implant was measured and noted to be intact.  Tincture of Benzoin was applied to the skin and 2 steristrips were placed across the defect, followed by a Band-Aid.  EBL was 1 ml and there were no complications.  Counts were correct and the patient tolerated the procedure well.   Assessment - Nexplanon removal, contraceptive consult  Plan - She has decided to use abstinence or condoms for contraception until further notice.  Instructions on wound care were reviewed with the patient and all her questions and concerns were addressed.  This was a 30-minute visit and over 50% of the time was spent in direct patient consultation and 10 minutes were spent in procedure.

## 2019-04-29 NOTE — PATIENT INSTRUCTIONS
If you have any questions regarding your visit, Please contact your care team.    [facility]: [phone]      [facility] HOURS TELEPHONE NUMBER   Dariana DO Yohana.    KWAME Cain -    NIRAJ Mckinnon       Monday, Wednesday, Thursday and Friday, [place]  8:30a.m-5:00 p.m   [facility]  [address] [place] [address]  [phone] ask for [facility]    Imaging [phone]       Urgent Care locations:    [facility] Saturday and Sunday   9 am - 5 pm    Monday-Friday   12 pm - 8 pm  Saturday and Sunday   9 am - 5 pm   [phone]     [facility] Labor and Delivery:  [phone]    If you need a medication refill, please contact your pharmacy. Please allow 3 business days for your refill to be completed.  As always, Thank you for trusting us with your healthcare needs!

## 2019-06-07 ENCOUNTER — TELEPHONE (OUTPATIENT)
Dept: FAMILY MEDICINE | Facility: CLINIC | Age: 24
End: 2019-06-07

## 2019-06-07 ENCOUNTER — OFFICE VISIT (OUTPATIENT)
Dept: FAMILY MEDICINE | Facility: CLINIC | Age: 24
End: 2019-06-07
Payer: COMMERCIAL

## 2019-06-07 VITALS
WEIGHT: 115 LBS | OXYGEN SATURATION: 95 % | DIASTOLIC BLOOD PRESSURE: 76 MMHG | SYSTOLIC BLOOD PRESSURE: 115 MMHG | TEMPERATURE: 98.4 F | BODY MASS INDEX: 20.38 KG/M2 | RESPIRATION RATE: 16 BRPM | HEIGHT: 63 IN | HEART RATE: 76 BPM

## 2019-06-07 DIAGNOSIS — J35.1 TONSILLAR ENLARGEMENT: Primary | ICD-10-CM

## 2019-06-07 DIAGNOSIS — Z23 ENCOUNTER FOR IMMUNIZATION: ICD-10-CM

## 2019-06-07 LAB
BASOPHILS # BLD AUTO: 0 10E9/L (ref 0–0.2)
BASOPHILS NFR BLD AUTO: 0.5 %
DEPRECATED S PYO AG THROAT QL EIA: NORMAL
DIFFERENTIAL METHOD BLD: NORMAL
EOSINOPHIL # BLD AUTO: 0.1 10E9/L (ref 0–0.7)
EOSINOPHIL NFR BLD AUTO: 0.9 %
ERYTHROCYTE [DISTWIDTH] IN BLOOD BY AUTOMATED COUNT: 12.7 % (ref 10–15)
HCT VFR BLD AUTO: 39 % (ref 35–47)
HETEROPH AB SER QL: NEGATIVE
HGB BLD-MCNC: 13.8 G/DL (ref 11.7–15.7)
LYMPHOCYTES # BLD AUTO: 2.9 10E9/L (ref 0.8–5.3)
LYMPHOCYTES NFR BLD AUTO: 34.8 %
MCH RBC QN AUTO: 31.3 PG (ref 26.5–33)
MCHC RBC AUTO-ENTMCNC: 35.4 G/DL (ref 31.5–36.5)
MCV RBC AUTO: 88 FL (ref 78–100)
MONOCYTES # BLD AUTO: 0.6 10E9/L (ref 0–1.3)
MONOCYTES NFR BLD AUTO: 6.7 %
NEUTROPHILS # BLD AUTO: 4.7 10E9/L (ref 1.6–8.3)
NEUTROPHILS NFR BLD AUTO: 57.1 %
PLATELET # BLD AUTO: 274 10E9/L (ref 150–450)
RBC # BLD AUTO: 4.41 10E12/L (ref 3.8–5.2)
SPECIMEN SOURCE: NORMAL
WBC # BLD AUTO: 8.2 10E9/L (ref 4–11)

## 2019-06-07 PROCEDURE — 87880 STREP A ASSAY W/OPTIC: CPT | Performed by: NURSE PRACTITIONER

## 2019-06-07 PROCEDURE — 86308 HETEROPHILE ANTIBODY SCREEN: CPT | Performed by: NURSE PRACTITIONER

## 2019-06-07 PROCEDURE — 90471 IMMUNIZATION ADMIN: CPT | Performed by: NURSE PRACTITIONER

## 2019-06-07 PROCEDURE — 90472 IMMUNIZATION ADMIN EACH ADD: CPT | Performed by: NURSE PRACTITIONER

## 2019-06-07 PROCEDURE — 85025 COMPLETE CBC W/AUTO DIFF WBC: CPT | Performed by: NURSE PRACTITIONER

## 2019-06-07 PROCEDURE — 36415 COLL VENOUS BLD VENIPUNCTURE: CPT | Performed by: NURSE PRACTITIONER

## 2019-06-07 PROCEDURE — 90714 TD VACC NO PRESV 7 YRS+ IM: CPT | Performed by: NURSE PRACTITIONER

## 2019-06-07 PROCEDURE — 99213 OFFICE O/P EST LOW 20 MIN: CPT | Mod: 25 | Performed by: NURSE PRACTITIONER

## 2019-06-07 PROCEDURE — 90651 9VHPV VACCINE 2/3 DOSE IM: CPT | Performed by: NURSE PRACTITIONER

## 2019-06-07 PROCEDURE — 87081 CULTURE SCREEN ONLY: CPT | Performed by: NURSE PRACTITIONER

## 2019-06-07 ASSESSMENT — MIFFLIN-ST. JEOR: SCORE: 1255.77

## 2019-06-07 ASSESSMENT — PAIN SCALES - GENERAL: PAINLEVEL: NO PAIN (0)

## 2019-06-07 NOTE — TELEPHONE ENCOUNTER
Please call patient to report negative strep and mono results.  As discussed in today's visit, if issue persists or worsens, with increasing pain or swelling to the tonsil, or accompanying symptoms such as fever, nausea, headaches, or other concerning symptoms, please schedule with ENT, referral ordered.  Please provide contact info for ENT.     Thanks,   RHONDA Amin

## 2019-06-07 NOTE — TELEPHONE ENCOUNTER
Spoke with patient and let her know her results and also gave her the number to ENT so that she can get scheduled with them.  Mag Jimenes MA

## 2019-06-07 NOTE — PATIENT INSTRUCTIONS
At [facility], we strive to deliver an exceptional experience to you, every time we see you.  If you receive a survey in the mail, please send us back your thoughts. We really do value your feedback.    Based on your medical history, these are the current health maintenance/preventive care services that you are due for (some may have been done at this visit.)  Health Maintenance Due   Topic Date Due     DTAP/TDAP/TD IMMUNIZATION (1 - Tdap) 06/15/2004     PHQ-2  01/01/2019     HPV IMMUNIZATION (3 - Female 3-dose series) 04/22/2019         Suggested websites for health information:  Www.[employer].org : Up to date and easily searchable information on multiple topics.  Www.medlineplus.gov : medication info, interactive tutorials, watch real surgeries online  Www.familydoctor.org : good info from the Academy of Family Physicians  Www.cdc.gov : public health info, travel advisories, epidemics (H1N1)  Www.aap.org : children's health info, normal development, vaccinations  Www.health.[facility].[place].us : [place] dept of health, public health issues in [place], N1N1    Your care team:                            Family Medicine Internal Medicine   MD Waldemar Colbert MD Shantel Branch-Fleming, MD Katya Georgiev PA-C Nam Ho, MD Pediatrics   HANS Renee, MD Natalia Hdez CNP, MD Deborah Mielke, MD Kim Thein, APRN CNP      Clinic hours: Monday - Thursday 7 am-7 pm; Fridays 7 am-5 pm.   Urgent care: Monday - Friday 11 am-9 pm; Saturday and Sunday 9 am-5 pm.  Pharmacy : Monday -Thursday 8 am-8 pm; Friday 8 am-6 pm; Saturday and Sunday 9 am-5 pm.     Clinic: [phone]   Pharmacy: [phone]

## 2019-06-07 NOTE — PROGRESS NOTES
Subjective     Zainab Sykes is a 23 year old female who presents to clinic today for the following health issues:    HPI   Concern - Right tonsil swollen   Onset: 1 week ago    Description:     Patient noticed R tonsil enlargement nearly 1 week ago.  Denies any accompanying sore throat, nasal congestion, cough, runny nose, fever. Notes sensation of having something in throat, though denies any concern for obstruction, respiratory difficulty, pain with speaking.     Intensity: mild    Progression of Symptoms:  same    Accompanying Signs & Symptoms:  None    Previous history of similar problem:   None    Precipitating factors:   Worsened by: None    Alleviating factors:  Improved by: None    Therapies Tried and outcome: Gargled salt water and cough drops- no relief     History seasonal allergies, no significant allergy symptoms at present.    No neck pain, no headache.   No abdominal pain, no nausea.   No fever.       Patient also requests updated immunizations while in clinic today.     Patient Active Problem List   Diagnosis     Palpitations     Constipation     Past Surgical History:   Procedure Laterality Date     ORTHOPEDIC SURGERY      both thumbs       Social History     Tobacco Use     Smoking status: Former Smoker     Last attempt to quit: 9/10/2017     Years since quittin.7     Smokeless tobacco: Never Used   Substance Use Topics     Alcohol use: Yes     Comment: socially     Family History   Problem Relation Age of Onset     Diabetes Paternal Grandfather      Coronary Artery Disease Paternal Grandfather      Hypertension Paternal Grandfather      Hyperlipidemia Paternal Grandfather      Hypertension Father      Hyperlipidemia Father      Cerebrovascular Disease Father      Leukemia Father         CLL     Breast Cancer Father      Colon Cancer Father      Chronic Obstructive Pulmonary Disease Other      Leukemia Maternal Aunt         X2     Ovarian Cancer Maternal Aunt      Depression Brother       "Thyroid Disease Mother      Substance Abuse No family hx of      Asthma No family hx of          Reviewed and updated as needed this visit by Provider  Tobacco  Allergies  Meds  Problems  Med Hx  Surg Hx  Fam Hx         Review of Systems   ROS COMP: Constitutional, neuro, ENT, endocrine, pulmonary, cardiac, gastrointestinal, genitourinary, musculoskeletal, integument and psychiatric systems are negative, except as otherwise noted.      Objective    /76 (BP Location: Right arm, Patient Position: Sitting, Cuff Size: Adult Regular)   Pulse 76   Temp 98.4  F (36.9  C) (Oral)   Resp 16   Ht 1.6 m (5' 3\")   Wt 52.2 kg (115 lb)   LMP 04/29/2019   SpO2 95%   Breastfeeding? No   BMI 20.37 kg/m    Body mass index is 20.37 kg/m .  Physical Exam  GENERAL: healthy, alert and no distress  EYES: Eyes grossly normal to inspection, PERRL and conjunctivae and sclerae normal.  EOM intact.   HENT: ear canals and TM's normal.  R tonsil 3+, L tonsil 2+.  Mild erythema to posterior pharynx.  No exudate. Uvula midline.  No sores/lesions noted.  No post-nasal discharge.    NECK: no adenopathy noted, no asymmetry, and thyroid normal to palpation. FROM demonstrated.  Non-tender throughout.   RESP: lungs clear to auscultation - no rales, rhonchi or wheezes  CV: regular rate and rhythm, normal S1 S2, no murmur.   ABDOMEN: soft, nontender, no hepatosplenomegaly, no masses and bowel sounds normal  MS: no gross musculoskeletal defects noted, no edema  SKIN: no suspicious lesions or rashes    Diagnostic Test Results:  Rapid strep - negative  Mono screen - negative.         Assessment & Plan     1. Tonsillar enlargement  Discussed multiple possible etiologies - likely viral process though will continue to monitor.   Rapid strep and mono negative.  Patient quite anxious/concerned, CBC obtained per patient request but well-appearing, afebrile.     Reviewed symptoms to monitor and those that would indicate need for prompt medical " re-evaluation.  Will persistent enlargement, consider ENT referral.    Supportive care discussed; ibuprofen PRN, saline gargles, humidifier use.  Increased fluids.     - Rapid strep screen  - CBC with platelets and differential  - Mononucleosis screen  - Beta strep group A culture    2. Encounter for immunization  -Td  - HPV, IM (9 - 26 YRS) - Gardasil 9           Return in about 1 week (around 6/14/2019), or if symptoms worsen or fail to improve.    RONY Traore [facility]

## 2019-06-08 LAB
BACTERIA SPEC CULT: NORMAL
SPECIMEN SOURCE: NORMAL

## 2019-08-02 ENCOUNTER — OFFICE VISIT (OUTPATIENT)
Dept: FAMILY MEDICINE | Facility: CLINIC | Age: 24
End: 2019-08-02
Payer: COMMERCIAL

## 2019-08-02 VITALS
DIASTOLIC BLOOD PRESSURE: 64 MMHG | OXYGEN SATURATION: 99 % | HEART RATE: 74 BPM | BODY MASS INDEX: 20.73 KG/M2 | HEIGHT: 63 IN | WEIGHT: 117 LBS | SYSTOLIC BLOOD PRESSURE: 102 MMHG | TEMPERATURE: 98.6 F

## 2019-08-02 DIAGNOSIS — Z32.00 PREGNANCY EXAMINATION OR TEST, PREGNANCY UNCONFIRMED: ICD-10-CM

## 2019-08-02 DIAGNOSIS — N92.6 MISSED PERIOD: Primary | ICD-10-CM

## 2019-08-02 LAB
B-HCG SERPL-ACNC: <1 IU/L (ref 0–5)
HCG UR QL: NEGATIVE

## 2019-08-02 PROCEDURE — 81025 URINE PREGNANCY TEST: CPT | Performed by: INTERNAL MEDICINE

## 2019-08-02 PROCEDURE — 36415 COLL VENOUS BLD VENIPUNCTURE: CPT | Performed by: INTERNAL MEDICINE

## 2019-08-02 PROCEDURE — 99213 OFFICE O/P EST LOW 20 MIN: CPT | Performed by: INTERNAL MEDICINE

## 2019-08-02 PROCEDURE — 84702 CHORIONIC GONADOTROPIN TEST: CPT | Performed by: INTERNAL MEDICINE

## 2019-08-02 ASSESSMENT — ANXIETY QUESTIONNAIRES
2. NOT BEING ABLE TO STOP OR CONTROL WORRYING: MORE THAN HALF THE DAYS
GAD7 TOTAL SCORE: 9
6. BECOMING EASILY ANNOYED OR IRRITABLE: MORE THAN HALF THE DAYS
7. FEELING AFRAID AS IF SOMETHING AWFUL MIGHT HAPPEN: NOT AT ALL
IF YOU CHECKED OFF ANY PROBLEMS ON THIS QUESTIONNAIRE, HOW DIFFICULT HAVE THESE PROBLEMS MADE IT FOR YOU TO DO YOUR WORK, TAKE CARE OF THINGS AT HOME, OR GET ALONG WITH OTHER PEOPLE: SOMEWHAT DIFFICULT
5. BEING SO RESTLESS THAT IT IS HARD TO SIT STILL: SEVERAL DAYS
3. WORRYING TOO MUCH ABOUT DIFFERENT THINGS: MORE THAN HALF THE DAYS
1. FEELING NERVOUS, ANXIOUS, OR ON EDGE: SEVERAL DAYS

## 2019-08-02 ASSESSMENT — PATIENT HEALTH QUESTIONNAIRE - PHQ9
5. POOR APPETITE OR OVEREATING: SEVERAL DAYS
SUM OF ALL RESPONSES TO PHQ QUESTIONS 1-9: 6

## 2019-08-02 ASSESSMENT — MIFFLIN-ST. JEOR: SCORE: 1259.84

## 2019-08-02 NOTE — PROGRESS NOTES
"Subjective     Zainab Sykes is a 24 year old female who presents to clinic today for the following health issues:    HPI   Confirmation of Pregnancy   Pt has taken 6 test and four out of 6 were positive.     Description (location/character/radiation): Pt faraz Lamb is here for pregnancy test.  She had her Nexplanon removed in April.  Periods quickly returned to normal after it was removed.  She is a week and half or so overdue for her.  Currently.  She took 6 pregnancy test at home, for which were positive in 2 of which were negative.  She and her boyfriend are using condoms but not consistently, as they cause her discomfort.  She does not want to get pregnant and would like to get started on a different birth control.        Reviewed and updated as needed this visit by Provider         Review of Systems    reviewed,  otherwise negative unless noted above.        Objective    /64 (BP Location: Left arm, Patient Position: Chair, Cuff Size: Adult Regular)   Pulse 74   Temp 98.6  F (37  C) (Tympanic)   Ht 1.6 m (5' 3\")   Wt 53.1 kg (117 lb)   SpO2 99%   BMI 20.73 kg/m    Body mass index is 20.73 kg/m .  Physical Exam   GENERAL: healthy, alert and no distress    Diagnostic Test Results:  Results for orders placed or performed in visit on 08/02/19 (from the past 24 hour(s))   HCG Qual, Urine ([id number])   Result Value Ref Range    HCG Qual Urine Negative NEG^Negative           Assessment & Plan       ICD-10-CM    1. Missed period N92.6 HCG Qual, Urine ([id number])     HCG Quantitative Pregnancy, Blood ([id number])   2. Pregnancy examination or test, pregnancy unconfirmed Z32.00 HCG Quantitative Pregnancy, Blood (QHV846)     Zainab has now had 3- pregnancy tests and for positive once.  We will check the blood hCG today.  Assuming this is negative, she reports she already has an appointment later this month with her OB/GYN and plans to discuss birth control at that appointment.        Return in about 1 month " (around 9/2/2019) for with ob-gyn.    Sugar Werner MD  [facility] [place]

## 2019-08-03 ASSESSMENT — ANXIETY QUESTIONNAIRES: GAD7 TOTAL SCORE: 9

## 2019-12-03 ENCOUNTER — OFFICE VISIT (OUTPATIENT)
Dept: FAMILY MEDICINE | Facility: CLINIC | Age: 24
End: 2019-12-03
Payer: COMMERCIAL

## 2019-12-03 VITALS
TEMPERATURE: 98.8 F | WEIGHT: 114.4 LBS | SYSTOLIC BLOOD PRESSURE: 108 MMHG | RESPIRATION RATE: 18 BRPM | HEART RATE: 83 BPM | BODY MASS INDEX: 20.27 KG/M2 | DIASTOLIC BLOOD PRESSURE: 68 MMHG | OXYGEN SATURATION: 97 %

## 2019-12-03 DIAGNOSIS — Z30.09 ENCOUNTER FOR OTHER GENERAL COUNSELING OR ADVICE ON CONTRACEPTION: ICD-10-CM

## 2019-12-03 DIAGNOSIS — Z11.3 SCREENING EXAMINATION FOR VENEREAL DISEASE: Primary | ICD-10-CM

## 2019-12-03 PROCEDURE — 87389 HIV-1 AG W/HIV-1&-2 AB AG IA: CPT | Performed by: PHYSICIAN ASSISTANT

## 2019-12-03 PROCEDURE — 86780 TREPONEMA PALLIDUM: CPT | Performed by: PHYSICIAN ASSISTANT

## 2019-12-03 PROCEDURE — 99213 OFFICE O/P EST LOW 20 MIN: CPT | Performed by: PHYSICIAN ASSISTANT

## 2019-12-03 PROCEDURE — 36415 COLL VENOUS BLD VENIPUNCTURE: CPT | Performed by: PHYSICIAN ASSISTANT

## 2019-12-03 PROCEDURE — 87591 N.GONORRHOEAE DNA AMP PROB: CPT | Performed by: PHYSICIAN ASSISTANT

## 2019-12-03 PROCEDURE — 87491 CHLMYD TRACH DNA AMP PROBE: CPT | Performed by: PHYSICIAN ASSISTANT

## 2019-12-03 NOTE — PROGRESS NOTES
Subjective     Zainab Sykes is a 24 year old female who presents to clinic today for the following health issues:    HPI   STD testing- no symptoms        Patient Active Problem List   Diagnosis     Palpitations     Constipation     Past Surgical History:   Procedure Laterality Date     ORTHOPEDIC SURGERY      both thumbs       Social History     Tobacco Use     Smoking status: Former Smoker     Types: Other     Last attempt to quit: 9/10/2017     Years since quittin.2     Smokeless tobacco: Never Used     Tobacco comment: Vap   Substance Use Topics     Alcohol use: Yes     Comment: socially     Family History   Problem Relation Age of Onset     Diabetes Paternal Grandfather      Coronary Artery Disease Paternal Grandfather      Hypertension Paternal Grandfather      Hyperlipidemia Paternal Grandfather      Hypertension Father      Hyperlipidemia Father      Cerebrovascular Disease Father      Leukemia Father         CLL     Breast Cancer Father      Colon Cancer Father      Chronic Obstructive Pulmonary Disease Other      Leukemia Maternal Aunt         X2     Ovarian Cancer Maternal Aunt      Depression Brother      Thyroid Disease Mother      Substance Abuse No family hx of      Asthma No family hx of            Reviewed and updated as needed this visit by Provider         Review of Systems   ROS COMP: Constitutional, and gu systems are negative, except as otherwise noted.      Objective    /68   Pulse 83   Temp 98.8  F (37.1  C) (Tympanic)   Resp 18   Wt 51.9 kg (114 lb 6.4 oz)   SpO2 97%   BMI 20.27 kg/m    Body mass index is 20.27 kg/m .  Physical Exam   GENERAL: healthy, alert and no distress  MS: no gross musculoskeletal defects noted, no edema  NEURO: Normal strength and tone, mentation intact and speech normal  PSYCH: mentation appears normal, affect normal/bright        Assessment & Plan   Assessment  1. Screening examination for venereal disease    2. Encounter for other general  counseling or advice on contraception         Plan  1) STD screening today.    2) Referral to GYN for Nexplanon replacement.       Return in about 9 months (around 9/3/2020) for your annual physical.    Tea Griffith PA-C  [facility]

## 2019-12-04 LAB
C TRACH DNA SPEC QL NAA+PROBE: NEGATIVE
HIV 1+2 AB+HIV1 P24 AG SERPL QL IA: NONREACTIVE
N GONORRHOEA DNA SPEC QL NAA+PROBE: NEGATIVE
SPECIMEN SOURCE: NORMAL
SPECIMEN SOURCE: NORMAL
T PALLIDUM AB SER QL: NONREACTIVE

## 2019-12-27 ENCOUNTER — TELEPHONE (OUTPATIENT)
Dept: OBGYN | Facility: CLINIC | Age: 24
End: 2019-12-27

## 2019-12-27 NOTE — TELEPHONE ENCOUNTER
Reason for Call:  Other appointment    Detailed comments: Would like a call to schedule an Implanon inserton. Would like to come in on Monday, 12/30/19.     Phone Number Patient can be reached at: Home number on file [phone] (home)    Best Time: ASAP    Can we leave a detailed message on this number? YES    Call taken on 12/27/2019 at 10:38 AM by Daxa Lee

## 2019-12-31 ENCOUNTER — OFFICE VISIT (OUTPATIENT)
Dept: OBGYN | Facility: CLINIC | Age: 24
End: 2019-12-31
Payer: COMMERCIAL

## 2019-12-31 VITALS
DIASTOLIC BLOOD PRESSURE: 69 MMHG | HEART RATE: 73 BPM | SYSTOLIC BLOOD PRESSURE: 108 MMHG | HEIGHT: 62 IN | BODY MASS INDEX: 20.8 KG/M2 | TEMPERATURE: 98.4 F | WEIGHT: 113 LBS | OXYGEN SATURATION: 95 %

## 2019-12-31 DIAGNOSIS — Z97.5 NEXPLANON IN PLACE: ICD-10-CM

## 2019-12-31 DIAGNOSIS — Z30.017 NEXPLANON INSERTION: Primary | ICD-10-CM

## 2019-12-31 LAB — HCG UR QL: NEGATIVE

## 2019-12-31 PROCEDURE — 81025 URINE PREGNANCY TEST: CPT | Performed by: ADVANCED PRACTICE MIDWIFE

## 2019-12-31 PROCEDURE — 11981 INSERTION DRUG DLVR IMPLANT: CPT | Performed by: ADVANCED PRACTICE MIDWIFE

## 2019-12-31 ASSESSMENT — MIFFLIN-ST. JEOR: SCORE: 1221.84

## 2019-12-31 ASSESSMENT — PAIN SCALES - GENERAL: PAINLEVEL: NO PAIN (0)

## 2019-12-31 NOTE — PATIENT INSTRUCTIONS
Leave the pressure dressing in place for 4-6 hours.  If you feel the dressing is too tight loosen it or remove it completely.  Leave the Band-Aid in place for 24 hours.  Change it if wet.   Leave the steri strip in place until it falls off by itself.  Call the clinic with fever, chills or bleeding from the site.   Use a back up method of birth control such as condoms or abstain from intercourse for 7 days.   Call the clinic for bleeding that is heavier than a normal period for you or if you experience severe pelvic pain.      What Nexplanon Users May Expect    For appropiate patients, Nexplanon is well tolerated and has a low early-removal rate.    Insertion site complications, such as prolonged pain or infection, are rare. Removal is occasionally difficult, and rarely requires a surgical procedure in the operating room.    Menstrual changes are common with Nexplanon. Bleeding may become more or less frequent or heavy, or absent. The bleeding pattern after the first three months is predictive of future bleeding, but the pattern may change at any time. Average bleeding is 18 days over 3 months. Over 50% of women experience rare over absent bleeding over the two year period, while 25% experience frequent or prolonged bleeding.    In clinical studies, users gained 3.7 pounds over two years. It is unknown what portion of this weight gain is related to Nexplanon    Women with a history of depressed mood may have worsening on Nexplanon, and may need to have the device removed.    Return to baseline ovulation patterns is seen 7-14 days after removal of Nexplanon.    Rarely, headaches and acne have also let to device removal.    Nexplanon may be less effective in women weight more than 130% of their ideal body weight.    Nexplanon does not protect against HIV or STDs.    Please call [facility] at [phone] if you have questions or concerns.    For more complete  information:  [email or web address]

## 2019-12-31 NOTE — PROGRESS NOTES
NEXPLANON INSERTION PROCEDURE    Zainab Sykes is a 24 year old  who presents for Nexplanon insertion. Patient's last menstrual period was 2019 (exact date).  The patient is currently using condoms  for contraception.   Has had nexplanon previously and has discussed with Dr Muller.  Has had two nexplanons in the past both in her left arm.  Had difficulty with the last insertion and both removals and would prefer to have this one placed in her right arm    Tests:    UPT was negative  Discussed risks of bleeding and infection with placement and the insertion procedure. Also discussed the possibility of irregular bleeding for 3-6 months and then often cessation of menses but possibility of continued abnormal bleeding. Small risk of migration of the Nexplanon or difficulty removing the Nexplanon. We also discussed possible side effects of weight gain, skin or hair changes, alterations in mood and increase in headaches.  Lasts for 3 years at which time she could have this one removed and another replaced. All questions answered and consent form signed.   Preprocedure medications: 1% plain lidocaine, 1-2 ml  Nexplanon Lot # [id number]      Exp date:2022   NDC 5019-3655-77    All equipment required was ready and available.  Patients allergies were confirmed.  The patient was placed in the supine position with her right  arm flexed at the elbow, externally rotated, and placed with her wrist parallel to her ear.  The insertion site was identified 6-8 cm above the elbow crease at the inner aspect overlying the bicepital groove.  The insertion site was marked with a sterile marker. The direction of insertion was also indicated with a caroline 6-8 cm proximal in the bicepital groove.  The insertion area was cleaned with betadine swabs and anesthetized with 3 cc of 1% lidocaine without epinephrine.  The Nexplanon was removed from its blister.  The needle shield was removed.  Counter-traction was applied  to the skin at the marked needle insertion site.  The tip of the needle was inserted at the site, beveled side up, at a slight angle.  The applicator was then lowered to a horizontal position.  The needle was inserted to its full length, keeping the needle parallel to the surface of the skin and the skin tented.   The cannula was retracted against the obturator.  The 4 cm vasyl was palpated under the skin.  The patient also palpated the vasyl.  A pressure bandage was applied with sterile gauze. The patient was instructed to remove the bangage in several hours and replace with a band-aid.    PLAN:   The patient was asked to contact the clinic for any fever/chills/severe pelvic or abdominal pain or heavy bleeding.     FOLLOW-UP:  She was asked to follow up for any problems.

## 2020-02-04 ENCOUNTER — OFFICE VISIT (OUTPATIENT)
Dept: FAMILY MEDICINE | Facility: CLINIC | Age: 25
End: 2020-02-04
Payer: COMMERCIAL

## 2020-02-04 VITALS
OXYGEN SATURATION: 99 % | HEIGHT: 63 IN | TEMPERATURE: 99.3 F | SYSTOLIC BLOOD PRESSURE: 118 MMHG | RESPIRATION RATE: 20 BRPM | DIASTOLIC BLOOD PRESSURE: 69 MMHG | HEART RATE: 64 BPM | BODY MASS INDEX: 20.38 KG/M2 | WEIGHT: 115 LBS

## 2020-02-04 DIAGNOSIS — Z20.2 EXPOSURE TO CHLAMYDIA: ICD-10-CM

## 2020-02-04 DIAGNOSIS — Z72.51 HIGH RISK HETEROSEXUAL BEHAVIOR: Primary | ICD-10-CM

## 2020-02-04 DIAGNOSIS — R30.0 DYSURIA: ICD-10-CM

## 2020-02-04 LAB
ALBUMIN UR-MCNC: NEGATIVE MG/DL
APPEARANCE UR: CLEAR
BILIRUB UR QL STRIP: NEGATIVE
COLOR UR AUTO: YELLOW
GLUCOSE UR STRIP-MCNC: NEGATIVE MG/DL
HGB UR QL STRIP: ABNORMAL
KETONES UR STRIP-MCNC: NEGATIVE MG/DL
LEUKOCYTE ESTERASE UR QL STRIP: NEGATIVE
MUCOUS THREADS #/AREA URNS LPF: PRESENT /LPF
NITRATE UR QL: NEGATIVE
NON-SQ EPI CELLS #/AREA URNS LPF: ABNORMAL /LPF
PH UR STRIP: 6 PH (ref 5–7)
RBC #/AREA URNS AUTO: ABNORMAL /HPF
SOURCE: ABNORMAL
SP GR UR STRIP: 1.02 (ref 1–1.03)
UROBILINOGEN UR STRIP-ACNC: 0.2 EU/DL (ref 0.2–1)
WBC #/AREA URNS AUTO: ABNORMAL /HPF

## 2020-02-04 PROCEDURE — 87389 HIV-1 AG W/HIV-1&-2 AB AG IA: CPT | Performed by: FAMILY MEDICINE

## 2020-02-04 PROCEDURE — 87491 CHLMYD TRACH DNA AMP PROBE: CPT | Performed by: FAMILY MEDICINE

## 2020-02-04 PROCEDURE — 87591 N.GONORRHOEAE DNA AMP PROB: CPT | Performed by: FAMILY MEDICINE

## 2020-02-04 PROCEDURE — 86780 TREPONEMA PALLIDUM: CPT | Performed by: FAMILY MEDICINE

## 2020-02-04 PROCEDURE — 86705 HEP B CORE ANTIBODY IGM: CPT | Performed by: FAMILY MEDICINE

## 2020-02-04 PROCEDURE — 99214 OFFICE O/P EST MOD 30 MIN: CPT | Performed by: FAMILY MEDICINE

## 2020-02-04 PROCEDURE — 36415 COLL VENOUS BLD VENIPUNCTURE: CPT | Performed by: FAMILY MEDICINE

## 2020-02-04 PROCEDURE — 81001 URINALYSIS AUTO W/SCOPE: CPT | Performed by: FAMILY MEDICINE

## 2020-02-04 PROCEDURE — 87340 HEPATITIS B SURFACE AG IA: CPT | Performed by: FAMILY MEDICINE

## 2020-02-04 PROCEDURE — 86706 HEP B SURFACE ANTIBODY: CPT | Performed by: FAMILY MEDICINE

## 2020-02-04 RX ORDER — AZITHROMYCIN 500 MG/1
1000 TABLET, FILM COATED ORAL DAILY
Qty: 2 TABLET | Refills: 0 | Status: SHIPPED | OUTPATIENT
Start: 2020-02-04 | End: 2020-03-03

## 2020-02-04 ASSESSMENT — PAIN SCALES - GENERAL: PAINLEVEL: NO PAIN (0)

## 2020-02-04 ASSESSMENT — MIFFLIN-ST. JEOR: SCORE: 1254.73

## 2020-02-04 NOTE — NURSING NOTE
"Chief Complaint   Patient presents with     STD     exposed, couple weeks ago did have frequent urination and burning.     Health Maintenance     PHQ2       Initial /69   Pulse 64   Temp 99.3  F (37.4  C) (Oral)   Resp 20   Ht 1.607 m (5' 3.25\")   Wt 52.2 kg (115 lb)   SpO2 99%   BMI 20.21 kg/m   Estimated body mass index is 20.21 kg/m  as calculated from the following:    Height as of this encounter: 1.607 m (5' 3.25\").    Weight as of this encounter: 52.2 kg (115 lb).  Medication Reconciliation: complete  Payton Easley, KWAME  "

## 2020-02-04 NOTE — PROGRESS NOTES
"SUBJECTIVE:  Zainab Sykes is a 24 year old female who scheduled an appointment to discuss being check for sexually transmitted diseases.  The patient's main reason for wanting this testing is that her ex-boyfriend has chlamydia   She last had sex with him 3 weeks ago   She was notified by him of this 3 day(s) ago     She did not always use condoms  .  She reports some symptoms at this time.  The symptoms are frequent urination and burning with urination     She denies any visual abnormalities in the pelvic area  Past Medical, social, family histories, medications, and allergies reviewed and updated    No current outpatient medications on file.    OBJECTIVE:  /69   Pulse 64   Temp 99.3  F (37.4  C) (Oral)   Resp 20   Ht 1.607 m (5' 3.25\")   Wt 52.2 kg (115 lb)   SpO2 99%   BMI 20.21 kg/m      EXAM:  GENERAL APPEARANCE: healthy, alert and no distress    EXAM:  Genitourinary: Deferred    ASSESSMENT/PLAN:  High risk sexual exposure with symptom(s). We will treat for chlamydia and screen the patient for Syphilis, HIV, Hepatitis B and C, and a urine for Chlamydia and Gonorrhea. She has no symptom(s) or genital lesions but I recommended that she return to clinic for an appointment if any develop.    She plans to be abstinent for the immediated future to prevent the chance of getting SEXUALLY TRANSMITTED INFECTION(S)'s     --------------------------------------------------------------------------------------------------------------------------------------    SUBJECTIVE:  Zainab Sykes is a 24 year old female who presents for an evaluation of possible depression. The patient reports that her current symptoms include depressed mood, diminished interest in activities and diminished concentration.    Last PHQ-9 score on record= 20    Depression Symptoms Unchanged See comments: See comments:   S-Disruption of Sleep  Falling asleep Staying asleep    I-  Lack of normal Interests  Yes No   G- Feelings of Guilt  " Yes No   E- Change in Energy level  Increased Decreased   C- Difficulty Concentrating  Poor    A- Change in Appetite  Increased Decreased   P- Psychomotor Symptoms  Agitation Retardation   S- Suicide Ideation  Yes No       The patient reports having these symptoms for approximately 3months. The patient reports that these symptoms have been waxing and waning since that time.     The patient deniesany symptom(s) of cycles of excessive energy, inabilty to sleep, risk taking behaivor (such as driving fast), hypersexuality, spending lots of money, gambling, or similar behaivors.      Current thoughts of suicide or homicide:No    The patient has had these treatment modalities in the past: individual therapy.       The patient reports that she has attended mental health counseling for her current symptoms.  She doeshave a mental health appointment scheduled for the near future.    Social History     Socioeconomic History     Marital status: Single     Spouse name: None     Number of children: None     Years of education: None     Highest education level: None   Occupational History     None   Social Needs     Financial resource strain: None     Food insecurity:     Worry: None     Inability: None     Transportation needs:     Medical: None     Non-medical: None   Tobacco Use     Smoking status: Former Smoker     Last attempt to quit: 9/10/2017     Years since quittin.4     Smokeless tobacco: Never Used   Substance and Sexual Activity     Alcohol use: Yes     Comment: socially     Drug use: No     Sexual activity: Yes     Partners: Male     Birth control/protection: Condom   Lifestyle     Physical activity:     Days per week: None     Minutes per session: None     Stress: None   Relationships     Social connections:     Talks on phone: None     Gets together: None     Attends Judaism service: None     Active member of club or organization: None     Attends meetings of clubs or organizations: None     Relationship  status: None     Intimate partner violence:     Fear of current or ex partner: None     Emotionally abused: None     Physically abused: None     Forced sexual activity: None   Other Topics Concern     Parent/sibling w/ CABG, MI or angioplasty before 65F 55M? Not Asked   Social History Narrative     None     Patient Active Problem List   Diagnosis     Palpitations     Constipation     No current outpatient medications on file.           OBJECTIVE:  Affect and mood: Junaids affect is described as normal/appropriate and her emotional attitude was open and cooperative.     Eyes: Eyes grossly normal to inspection and conjunctivae and sclerae normal  Neck: supple without adenopathy or thyromegaly.  Lungs: Clear to auscultation with normal respiratory effort.   Heart: Regular rate and rhythm with normal S1, S2.  No murmurs, clicks, or gallops.  Extremities: No edema.   Neuro: DTR symmetrically normal in lower extremities    ASSESSMENT:  Chronic Depression      PLAN:  The patient is active in therapy at this time and declined starting a medication.   She does not feel that she is at any risk of harming herself at this time.     I asked that if she does have any thoughts of harming herself to call the clinic or 911 or to go to the emergency department as soon as possible.

## 2020-02-05 ENCOUNTER — TELEPHONE (OUTPATIENT)
Dept: FAMILY MEDICINE | Facility: CLINIC | Age: 25
End: 2020-02-05

## 2020-02-05 LAB
C TRACH DNA SPEC QL NAA+PROBE: POSITIVE
HBV CORE IGM SERPL QL IA: NONREACTIVE
HBV SURFACE AB SERPL IA-ACNC: 2.62 M[IU]/ML
HBV SURFACE AG SERPL QL IA: NONREACTIVE
HIV 1+2 AB+HIV1 P24 AG SERPL QL IA: NONREACTIVE
N GONORRHOEA DNA SPEC QL NAA+PROBE: NEGATIVE
SPECIMEN SOURCE: ABNORMAL
SPECIMEN SOURCE: NORMAL
T PALLIDUM AB SER QL: NONREACTIVE

## 2020-02-05 ASSESSMENT — PATIENT HEALTH QUESTIONNAIRE - PHQ9: SUM OF ALL RESPONSES TO PHQ QUESTIONS 1-9: 23

## 2020-02-05 NOTE — RESULT ENCOUNTER NOTE
Zainab,  I have reviewed the results of the laboratory tests that we recently ordered. All of the lab work performed was normal or considered normal for you except the chlamydia test was positive. Please make sure to get the prescription(s) that I called in yesterday. .  Sincerely,   Baldo Lamas MD

## 2020-02-21 ENCOUNTER — TELEPHONE (OUTPATIENT)
Dept: FAMILY MEDICINE | Facility: CLINIC | Age: 25
End: 2020-02-21

## 2020-02-21 NOTE — TELEPHONE ENCOUNTER
TC- [facility] STD reporting form filled out. Please fax over to highlighted number on the form.  Form was placed in the TC box in the MA station in pediatrics.  Thank you. Zeinab Groves R.N., MD REPORTABLE DISEASE   Received: Today   Message Contents   Terrell Bentley sent to [facility]             This patient was positive for CHLAMYDIA on 2/4/20. Please report this to [facility] if it has not already been completed. Thank you.      Viewed by Zainab Sykes on 2/5/2020  3:09 PM   Written by Baldo Lamas MD on 2/5/2020  2:28 PM   Zainab,   I have reviewed the results of the laboratory tests that we recently ordered. All of the lab work performed was normal or considered normal for you except the chlamydia test was positive. Please make sure to get the prescription(s) that I called in yesterday. .   Sincerely,   Baldo Lamas MD     I did call the patient to verify she did get Dr. Lamas's message and did take the treatment.  The patient states she did take the treatment and actually wanted to make a follow-up appointment which I did help her with. Thank you. Zeinab Groves R.N.    Next 5 appointments (look out 90 days)    Mar 03, 2020  1:30 PM CST  SHORT with Baldo Lamas MD  [facility] [address] [facility] [address] [phone]

## 2020-02-24 ENCOUNTER — HEALTH MAINTENANCE LETTER (OUTPATIENT)
Age: 25
End: 2020-02-24

## 2020-03-03 ENCOUNTER — OFFICE VISIT (OUTPATIENT)
Dept: FAMILY MEDICINE | Facility: CLINIC | Age: 25
End: 2020-03-03
Payer: COMMERCIAL

## 2020-03-03 VITALS
WEIGHT: 118 LBS | RESPIRATION RATE: 16 BRPM | TEMPERATURE: 98.8 F | DIASTOLIC BLOOD PRESSURE: 74 MMHG | HEART RATE: 84 BPM | SYSTOLIC BLOOD PRESSURE: 117 MMHG | BODY MASS INDEX: 20.74 KG/M2

## 2020-03-03 DIAGNOSIS — Z72.51 HIGH RISK HETEROSEXUAL BEHAVIOR: Primary | ICD-10-CM

## 2020-03-03 DIAGNOSIS — Z86.19 HISTORY OF CHLAMYDIA: ICD-10-CM

## 2020-03-03 DIAGNOSIS — Z13.1 SCREENING FOR DIABETES MELLITUS: ICD-10-CM

## 2020-03-03 DIAGNOSIS — K59.00 CONSTIPATION, UNSPECIFIED CONSTIPATION TYPE: ICD-10-CM

## 2020-03-03 DIAGNOSIS — Z13.6 CARDIOVASCULAR SCREENING; LDL GOAL LESS THAN 160: ICD-10-CM

## 2020-03-03 PROCEDURE — 87491 CHLMYD TRACH DNA AMP PROBE: CPT | Performed by: FAMILY MEDICINE

## 2020-03-03 PROCEDURE — 99212 OFFICE O/P EST SF 10 MIN: CPT | Performed by: FAMILY MEDICINE

## 2020-03-03 NOTE — PROGRESS NOTES
SUBJECTIVE:  Zainab Sykes is a 24 year old female who scheduled an appointment to discuss being rechecked for chlamydia  The patient's main reason for wanting this is she was recently treated for it.  She denies any symptoms at this time.  She took the azithromycin and did not vomit.  She has not had sex since that time.       Past Medical, social, family histories, medications, and allergies reviewed and updated    No current outpatient medications on file.    OBJECTIVE:  /74   Pulse 84   Temp 98.8  F (37.1  C) (Oral)   Resp 16   Wt 53.5 kg (118 lb)   BMI 20.74 kg/m      EXAM:  GENERAL APPEARANCE: healthy, alert and no distress      ASSESSMENT/PLAN:  High risk sexual exposure. We will test for cure of Chlamydia.   She has the Nexplanon vis Dr Sears.  Condom use recommend(ed) until she finds her lifetime partner.    Patient Instructions   I recommended that she return to clinic for an appointment in the next few month(s)  for her yearly complete physical exam and we will get all of her preventative medical needs taken care of at that time. I also recommended that he have a laboratory appointment 1 week prior to that to do her fasting laboratory work.

## 2020-03-04 LAB
C TRACH DNA SPEC QL NAA+PROBE: NEGATIVE
SPECIMEN SOURCE: NORMAL

## 2020-03-04 NOTE — RESULT ENCOUNTER NOTE
Zainab,  I have reviewed the results of the laboratory tests that we recently ordered. All of the lab work performed was normal or considered normal for you.  Sincerely,   Baldo Lamas MD

## 2020-07-21 ENCOUNTER — TRANSFERRED RECORDS (OUTPATIENT)
Dept: HEALTH INFORMATION MANAGEMENT | Facility: CLINIC | Age: 25
End: 2020-07-21

## 2020-07-24 ENCOUNTER — TRANSFERRED RECORDS (OUTPATIENT)
Dept: HEALTH INFORMATION MANAGEMENT | Facility: CLINIC | Age: 25
End: 2020-07-24

## 2020-09-01 ENCOUNTER — E-VISIT (OUTPATIENT)
Dept: FAMILY MEDICINE | Facility: CLINIC | Age: 25
End: 2020-09-01
Payer: COMMERCIAL

## 2020-09-01 DIAGNOSIS — R30.0 DYSURIA: ICD-10-CM

## 2020-09-01 DIAGNOSIS — N89.8 VAGINAL DISCHARGE: Primary | ICD-10-CM

## 2020-09-01 DIAGNOSIS — Z72.51 HIGH RISK HETEROSEXUAL BEHAVIOR: ICD-10-CM

## 2020-09-01 PROCEDURE — 99421 OL DIG E/M SVC 5-10 MIN: CPT | Performed by: FAMILY MEDICINE

## 2020-09-03 DIAGNOSIS — R30.0 DYSURIA: ICD-10-CM

## 2020-09-03 DIAGNOSIS — N89.8 VAGINAL DISCHARGE: ICD-10-CM

## 2020-09-03 DIAGNOSIS — Z72.51 HIGH RISK HETEROSEXUAL BEHAVIOR: ICD-10-CM

## 2020-09-03 DIAGNOSIS — R82.90 NONSPECIFIC FINDING ON EXAMINATION OF URINE: Primary | ICD-10-CM

## 2020-09-03 LAB
ALBUMIN UR-MCNC: NEGATIVE MG/DL
APPEARANCE UR: ABNORMAL
BACTERIA #/AREA URNS HPF: ABNORMAL /HPF
BILIRUB UR QL STRIP: NEGATIVE
COLOR UR AUTO: YELLOW
GLUCOSE UR STRIP-MCNC: NEGATIVE MG/DL
HGB UR QL STRIP: ABNORMAL
KETONES UR STRIP-MCNC: NEGATIVE MG/DL
LEUKOCYTE ESTERASE UR QL STRIP: ABNORMAL
NITRATE UR QL: NEGATIVE
NON-SQ EPI CELLS #/AREA URNS LPF: ABNORMAL /LPF
PH UR STRIP: 6.5 PH (ref 5–7)
RBC #/AREA URNS AUTO: ABNORMAL /HPF
SOURCE: ABNORMAL
SP GR UR STRIP: 1.01 (ref 1–1.03)
SPECIMEN SOURCE: NORMAL
UROBILINOGEN UR STRIP-ACNC: 0.2 EU/DL (ref 0.2–1)
WBC #/AREA URNS AUTO: ABNORMAL /HPF
WBC CLUMPS #/AREA URNS HPF: PRESENT /HPF
WET PREP SPEC: NORMAL

## 2020-09-03 PROCEDURE — 87491 CHLMYD TRACH DNA AMP PROBE: CPT | Performed by: FAMILY MEDICINE

## 2020-09-03 PROCEDURE — 87186 SC STD MICRODIL/AGAR DIL: CPT | Performed by: FAMILY MEDICINE

## 2020-09-03 PROCEDURE — 87210 SMEAR WET MOUNT SALINE/INK: CPT | Performed by: FAMILY MEDICINE

## 2020-09-03 PROCEDURE — 87591 N.GONORRHOEAE DNA AMP PROB: CPT | Performed by: FAMILY MEDICINE

## 2020-09-03 PROCEDURE — 87088 URINE BACTERIA CULTURE: CPT | Performed by: FAMILY MEDICINE

## 2020-09-03 PROCEDURE — 81001 URINALYSIS AUTO W/SCOPE: CPT | Performed by: FAMILY MEDICINE

## 2020-09-03 PROCEDURE — 87086 URINE CULTURE/COLONY COUNT: CPT | Performed by: FAMILY MEDICINE

## 2020-09-04 DIAGNOSIS — A74.9 CHLAMYDIA INFECTION: Primary | ICD-10-CM

## 2020-09-04 LAB
C TRACH DNA SPEC QL NAA+PROBE: POSITIVE
N GONORRHOEA DNA SPEC QL NAA+PROBE: NEGATIVE
SPECIMEN SOURCE: ABNORMAL
SPECIMEN SOURCE: NORMAL

## 2020-09-04 RX ORDER — DOXYCYCLINE HYCLATE 100 MG
100 TABLET ORAL 2 TIMES DAILY
Qty: 14 TABLET | Refills: 0 | Status: SHIPPED | OUTPATIENT
Start: 2020-09-04 | End: 2020-09-11

## 2020-09-05 LAB
BACTERIA SPEC CULT: ABNORMAL
Lab: ABNORMAL
SPECIMEN SOURCE: ABNORMAL

## 2020-09-06 DIAGNOSIS — N39.0 E. COLI UTI: Primary | ICD-10-CM

## 2020-09-06 DIAGNOSIS — B96.20 E. COLI UTI: Primary | ICD-10-CM

## 2020-09-06 RX ORDER — NITROFURANTOIN 25; 75 MG/1; MG/1
100 CAPSULE ORAL 2 TIMES DAILY
Qty: 6 CAPSULE | Refills: 0 | Status: SHIPPED | OUTPATIENT
Start: 2020-09-06 | End: 2020-09-09

## 2020-09-10 ENCOUNTER — TELEPHONE (OUTPATIENT)
Dept: FAMILY MEDICINE | Facility: CLINIC | Age: 25
End: 2020-09-10

## 2020-09-10 NOTE — TELEPHONE ENCOUNTER
RN - please complete [facility] form and place in TC basket to be faxed. Daxa Lee TC/Pt Rep      ----- Message from Gaviota Smith sent at 9/10/2020  1:02 PM CDT -----  Regarding: MD Reportable  This patient was positive for Chlamydia on 9/3. Please report this to [facility] if it has not already been completed. Thank you.

## 2020-09-10 NOTE — TELEPHONE ENCOUNTER
STD reporting form filled out for the [place] [facility] and faxed to [phone].    Hannah PRADON, RN

## 2020-09-15 ENCOUNTER — OFFICE VISIT (OUTPATIENT)
Dept: FAMILY MEDICINE | Facility: CLINIC | Age: 25
End: 2020-09-15
Payer: COMMERCIAL

## 2020-09-15 VITALS
BODY MASS INDEX: 23.02 KG/M2 | HEART RATE: 97 BPM | OXYGEN SATURATION: 96 % | WEIGHT: 131 LBS | TEMPERATURE: 99.8 F | SYSTOLIC BLOOD PRESSURE: 100 MMHG | DIASTOLIC BLOOD PRESSURE: 67 MMHG

## 2020-09-15 DIAGNOSIS — Z20.2 EXPOSURE TO CHLAMYDIA: Primary | ICD-10-CM

## 2020-09-15 PROCEDURE — 99213 OFFICE O/P EST LOW 20 MIN: CPT | Performed by: FAMILY MEDICINE

## 2020-09-15 PROCEDURE — 87491 CHLMYD TRACH DNA AMP PROBE: CPT | Performed by: FAMILY MEDICINE

## 2020-09-15 ASSESSMENT — ANXIETY QUESTIONNAIRES
3. WORRYING TOO MUCH ABOUT DIFFERENT THINGS: SEVERAL DAYS
GAD7 TOTAL SCORE: 3
2. NOT BEING ABLE TO STOP OR CONTROL WORRYING: NOT AT ALL
1. FEELING NERVOUS, ANXIOUS, OR ON EDGE: NOT AT ALL
IF YOU CHECKED OFF ANY PROBLEMS ON THIS QUESTIONNAIRE, HOW DIFFICULT HAVE THESE PROBLEMS MADE IT FOR YOU TO DO YOUR WORK, TAKE CARE OF THINGS AT HOME, OR GET ALONG WITH OTHER PEOPLE: NOT DIFFICULT AT ALL
7. FEELING AFRAID AS IF SOMETHING AWFUL MIGHT HAPPEN: NOT AT ALL
6. BECOMING EASILY ANNOYED OR IRRITABLE: SEVERAL DAYS
5. BEING SO RESTLESS THAT IT IS HARD TO SIT STILL: NOT AT ALL

## 2020-09-15 ASSESSMENT — PATIENT HEALTH QUESTIONNAIRE - PHQ9
SUM OF ALL RESPONSES TO PHQ QUESTIONS 1-9: 5
5. POOR APPETITE OR OVEREATING: SEVERAL DAYS

## 2020-09-15 ASSESSMENT — PAIN SCALES - GENERAL: PAINLEVEL: NO PAIN (0)

## 2020-09-15 NOTE — PROGRESS NOTES
SUBJECTIVE:   Zainab Sykes is a 25 year old female who presents today for follow up on her chlamydia infection.   She reports that she took both of the antibiotic(s) that she was prescribed.   She vomited up one dose of the doxycycline otherwise she took all of them.  She reports that her symptom(s) of UTI are gone      She has been with the same lindsey for 3 month(s)   He was tested and treated for chlamydia as well.     She has been diagnosed with chlamydia several times.       Past Medical History:   Diagnosis Date     Anxiety and depression      Depressive disorder      Current Outpatient Medications   Medication Sig Dispense Refill     etonogestrel (NEXPLANON) 68 MG IMPL 1 each by Subdermal route once 12/31/2019       Social History     Tobacco Use     Smoking status: Former Smoker     Last attempt to quit: 9/10/2017     Years since quitting: 3.0     Smokeless tobacco: Never Used   Substance Use Topics     Alcohol use: Yes     Comment: socially       OBJECTIVE:  /67   Pulse 97   Temp 99.8  F (37.7  C) (Tympanic)   Wt 59.4 kg (131 lb)   SpO2 96%   BMI 23.02 kg/m    GENERAL APPEARANCE: healthy, alert and no distress  RESP: lungs clear to auscultation - no rales, rhonchi or wheezes  CV: regular rates and rhythm, normal S1 S2, no murmur noted  ABDOMEN:  soft, nontender, no HSM or masses and bowel sounds normal  BACK: No CVA tenderness  GU_female: external genitalia normal, vagina normal without abnormal appearing discharge, urethra without abnormality or discharge, cervix normal in appearance , no uterine or adenexal tenderness or masses noted, no cervical motion tenderness, exam chaperoned by my medical assistant   SKIN: no suspicious lesions or rashes    No results found for any visits on 09/15/20.     ASSESSMENT and PLAN:   Chlamdia infection  No sign or symptom(s) of PID  We will test for cure today   Lower, uncomplicated urinary tract infection resolved  We discussed future protection from SEXUALLY  TRANSMITTED INFECTION(S)'s

## 2020-09-16 ASSESSMENT — ANXIETY QUESTIONNAIRES: GAD7 TOTAL SCORE: 3

## 2020-09-17 DIAGNOSIS — A74.9 CHLAMYDIA INFECTION: ICD-10-CM

## 2020-09-17 LAB
C TRACH DNA SPEC QL NAA+PROBE: POSITIVE
SPECIMEN SOURCE: ABNORMAL

## 2020-09-17 RX ORDER — AZITHROMYCIN 500 MG/1
1000 TABLET, FILM COATED ORAL DAILY
Qty: 2 TABLET | Refills: 0 | Status: SHIPPED | OUTPATIENT
Start: 2020-09-17 | End: 2020-09-18

## 2020-09-18 NOTE — RESULT ENCOUNTER NOTE
Zainab,  I have reviewed the results of the laboratory tests that we recently ordered. The chlamydia test was positive. I would recommend(ed) treatment with a different medication called azithromycin. I called that into [id number] - [place], [place] - [address] [facility] [address]  We should test for a cure in 4 weeks and not sooner. The test is very sensitive and if done too quickly after treatment  it can detect the chlamydia DNA even after the infection is cured.    Sincerely,   Baldo Lamas MD

## 2020-11-02 ENCOUNTER — TELEPHONE (OUTPATIENT)
Dept: FAMILY MEDICINE | Facility: CLINIC | Age: 25
End: 2020-11-02

## 2020-11-02 DIAGNOSIS — A74.9 CHLAMYDIA INFECTION: Primary | ICD-10-CM

## 2020-11-02 NOTE — TELEPHONE ENCOUNTER
Reason for Call:  Other questions     Detailed comments: Patient wants to know if she can do labs for follow up with previous result. [facility] next available appointment is 11/24/2020 and patient was told to follow up 4-6 weeks from last visit. Please call back    Phone Number Patient can be reached at: Home number on file [phone] (home)    Best Time: any    Can we leave a detailed message on this number? YES    Call taken on 11/2/2020 at 2:28 PM by Queenie Phoenix

## 2020-11-02 NOTE — TELEPHONE ENCOUNTER
Called the patient @ [phone]. I have scheduled her for a lab appt. On 11/3/2020.  COLTON Rooney

## 2020-11-02 NOTE — TELEPHONE ENCOUNTER
Patient needs a lab only appointment to do chlamydia retest to make sure she is negative.   She is overdue now.  Order in Epic.   TC, please help her set up lab appointment.     Hannah PRADON, RN

## 2020-11-03 DIAGNOSIS — K59.00 CONSTIPATION, UNSPECIFIED CONSTIPATION TYPE: ICD-10-CM

## 2020-11-03 DIAGNOSIS — A74.9 CHLAMYDIA INFECTION: ICD-10-CM

## 2020-11-03 DIAGNOSIS — Z13.1 SCREENING FOR DIABETES MELLITUS: ICD-10-CM

## 2020-11-03 LAB
ALBUMIN SERPL-MCNC: 4.2 G/DL (ref 3.4–5)
ALP SERPL-CCNC: 62 U/L (ref 40–150)
ALT SERPL W P-5'-P-CCNC: 16 U/L (ref 0–50)
ANION GAP SERPL CALCULATED.3IONS-SCNC: 6 MMOL/L (ref 3–14)
AST SERPL W P-5'-P-CCNC: 14 U/L (ref 0–45)
BILIRUB SERPL-MCNC: 0.9 MG/DL (ref 0.2–1.3)
BUN SERPL-MCNC: 7 MG/DL (ref 7–30)
CALCIUM SERPL-MCNC: 9.2 MG/DL (ref 8.5–10.1)
CHLORIDE SERPL-SCNC: 105 MMOL/L (ref 94–109)
CO2 SERPL-SCNC: 26 MMOL/L (ref 20–32)
CREAT SERPL-MCNC: 0.95 MG/DL (ref 0.52–1.04)
GFR SERPL CREATININE-BSD FRML MDRD: 84 ML/MIN/{1.73_M2}
GLUCOSE SERPL-MCNC: 85 MG/DL (ref 70–99)
POTASSIUM SERPL-SCNC: 3.7 MMOL/L (ref 3.4–5.3)
PROT SERPL-MCNC: 7.4 G/DL (ref 6.8–8.8)
SODIUM SERPL-SCNC: 137 MMOL/L (ref 133–144)
TSH SERPL DL<=0.005 MIU/L-ACNC: 2.18 MU/L (ref 0.4–4)

## 2020-11-03 PROCEDURE — 87491 CHLMYD TRACH DNA AMP PROBE: CPT | Performed by: FAMILY MEDICINE

## 2020-11-03 PROCEDURE — 80053 COMPREHEN METABOLIC PANEL: CPT | Performed by: FAMILY MEDICINE

## 2020-11-03 PROCEDURE — 84443 ASSAY THYROID STIM HORMONE: CPT | Performed by: FAMILY MEDICINE

## 2020-11-04 LAB
C TRACH DNA SPEC QL NAA+PROBE: NEGATIVE
SPECIMEN SOURCE: NORMAL

## 2020-11-04 NOTE — RESULT ENCOUNTER NOTE
Zainab,  I have reviewed the results of the laboratory tests that we recently ordered. All of the laboratory that are back so far are normal or considered normal for you. There are still some labs pending and I will let you know those results when they   are available.  Sincerely,   Baldo Lamas MD

## 2020-12-13 ENCOUNTER — HEALTH MAINTENANCE LETTER (OUTPATIENT)
Age: 25
End: 2020-12-13

## 2021-01-04 NOTE — PROGRESS NOTES
Assessment & Plan     Breakthrough bleeding on Nexplanon  Discussed breakthrough bleeding with Nexplanon as a common side effect. Discussed that risks of breakthrough bleeding decrease with time, but can occur at anytime while the implant is in place. Discussed options of managing bleeding with oral estrogen to see if we can suppress the bleeding temporarily and if that can provide longer relief of the irregular bleeding. Discussed this may only be a short term fix. Also discussed removal and alternate options. Patient would like to try estrogen first. Prescription sent to pharmacy, discussed use, possible side effects. Advised to expect a withdrawal bleed a few days after completing prescription. Patient is given an opportunity to ask questions and have them answered.  - Hemoglobin  - estradiol (ESTRACE) 1 MG tablet; Take 1 tablet (1 mg) by mouth daily    RONY Chacko CNP [facility]     Zainab Sykes is a 25 year old female who presents to clinic today for the following health issues     HPI       Heavy vaginal bleeding with Nexplanon    Patient presents today with her mother to discuss breakthrough bleeding with Nexplanon. Has been in place for about 1 year. Has had implant previously as well. Has also tried combined oral contraceptive pill with breakthrough bleeding.   For the last year, would get occasional spotting but in the last 6 weeks has had consistent daily bleeding. Flow can vary, but also having increased cramping and some clots-especially when up and more active. Denies dizziness, headaches, fatigue.   Seen in the ED Sept 2019 for DUB and ultrasound was done and was normal. Overall has been happy with the implant.    Review of Systems   Constitutional, HEENT, cardiovascular, pulmonary, gi and gu systems are negative, except as otherwise noted.      Objective    /68 (BP Location: Right arm, Patient Position: Sitting, Cuff Size: Adult  "Regular)   Pulse 84   Temp 97.6  F (36.4  C) (Tympanic)   Ht 1.581 m (5' 2.25\")   Wt 60.1 kg (132 lb 9.6 oz)   SpO2 97%   BMI 24.06 kg/m    Body mass index is 24.06 kg/m .  Physical Exam   GENERAL: healthy, alert and no distress  MS: no gross musculoskeletal defects noted, no edema. Implant easily palpable in her right upper extremity.  SKIN: no suspicious lesions or rashes  PSYCH: mentation appears normal, affect normal/bright    Results for orders placed or performed in visit on 01/05/21   Hemoglobin     Status: None   Result Value Ref Range    Hemoglobin 12.8 11.7 - 15.7 g/dL         "

## 2021-01-05 ENCOUNTER — OFFICE VISIT (OUTPATIENT)
Dept: OBGYN | Facility: CLINIC | Age: 26
End: 2021-01-05
Payer: COMMERCIAL

## 2021-01-05 VITALS
SYSTOLIC BLOOD PRESSURE: 105 MMHG | BODY MASS INDEX: 24.4 KG/M2 | HEART RATE: 84 BPM | TEMPERATURE: 97.6 F | HEIGHT: 62 IN | WEIGHT: 132.6 LBS | DIASTOLIC BLOOD PRESSURE: 68 MMHG | OXYGEN SATURATION: 97 %

## 2021-01-05 DIAGNOSIS — Z97.5 BREAKTHROUGH BLEEDING ON NEXPLANON: Primary | ICD-10-CM

## 2021-01-05 DIAGNOSIS — N92.1 BREAKTHROUGH BLEEDING ON NEXPLANON: Primary | ICD-10-CM

## 2021-01-05 DIAGNOSIS — Z13.6 CARDIOVASCULAR SCREENING; LDL GOAL LESS THAN 160: ICD-10-CM

## 2021-01-05 LAB
CHOLEST SERPL-MCNC: 148 MG/DL
HDLC SERPL-MCNC: 88 MG/DL
HGB BLD-MCNC: 12.8 G/DL (ref 11.7–15.7)
LDLC SERPL CALC-MCNC: 51 MG/DL
NONHDLC SERPL-MCNC: 60 MG/DL
TRIGL SERPL-MCNC: 46 MG/DL

## 2021-01-05 PROCEDURE — 36415 COLL VENOUS BLD VENIPUNCTURE: CPT | Performed by: FAMILY MEDICINE

## 2021-01-05 PROCEDURE — 80061 LIPID PANEL: CPT | Performed by: FAMILY MEDICINE

## 2021-01-05 PROCEDURE — 85018 HEMOGLOBIN: CPT | Performed by: NURSE PRACTITIONER

## 2021-01-05 PROCEDURE — 99213 OFFICE O/P EST LOW 20 MIN: CPT | Performed by: NURSE PRACTITIONER

## 2021-01-05 RX ORDER — ESTRADIOL 1 MG/1
1 TABLET ORAL DAILY
Qty: 28 TABLET | Refills: 0 | Status: SHIPPED | OUTPATIENT
Start: 2021-01-05 | End: 2021-02-02

## 2021-01-05 ASSESSMENT — PAIN SCALES - GENERAL: PAINLEVEL: NO PAIN (0)

## 2021-01-05 ASSESSMENT — MIFFLIN-ST. JEOR: SCORE: 1313.69

## 2021-02-02 ENCOUNTER — OFFICE VISIT (OUTPATIENT)
Dept: OBGYN | Facility: CLINIC | Age: 26
End: 2021-02-02
Payer: COMMERCIAL

## 2021-02-02 VITALS
HEART RATE: 82 BPM | BODY MASS INDEX: 23.71 KG/M2 | TEMPERATURE: 97.9 F | HEIGHT: 63 IN | WEIGHT: 133.8 LBS | OXYGEN SATURATION: 98 % | DIASTOLIC BLOOD PRESSURE: 67 MMHG | SYSTOLIC BLOOD PRESSURE: 108 MMHG

## 2021-02-02 DIAGNOSIS — Z30.09 BIRTH CONTROL COUNSELING: ICD-10-CM

## 2021-02-02 DIAGNOSIS — Z30.46 NEXPLANON REMOVAL: Primary | ICD-10-CM

## 2021-02-02 PROCEDURE — 11982 REMOVE DRUG IMPLANT DEVICE: CPT | Performed by: NURSE PRACTITIONER

## 2021-02-02 PROCEDURE — 99212 OFFICE O/P EST SF 10 MIN: CPT | Mod: 25 | Performed by: NURSE PRACTITIONER

## 2021-02-02 ASSESSMENT — PAIN SCALES - GENERAL: PAINLEVEL: NO PAIN (0)

## 2021-02-02 ASSESSMENT — MIFFLIN-ST. JEOR: SCORE: 1323.1

## 2021-02-02 NOTE — PROGRESS NOTES
"  Assessment & Plan     Birth control counseling  Discussed options for contraception with patient. At this time, she is electing to give her body a break and use condoms.   If she chooses to start on contraception, likely thinking vaginal ring, transdermal patches or Depo Provera. Education completed on all three options. She will call if she would like to proceed. Advised her bleeding may increase in the next few days, may last for the next week. If it does not resolve or remains, heavy and/or painful after the next few days, to call me. Consider ultrasound if bleeding persists despite trial of alternate method. Patient is given an opportunity to ask questions and have them answered.    10 minutes spent on the date of the encounter doing chart review, history and exam, documentation and further activities as noted above-this is over and above time spent in the Nexplanon removal procedure.    RONY Chacko [facility] KRISTA Lamb is a 25 year old who presents to clinic today for the following health issues     HPI   Contraception  If Nexplanon is removed today, would like to discuss options. Schedule varies and so she does not think a pill would be a good option. Does not want IUD. No contraindications to use of hormonal contraception.     Review of Systems   Constitutional, HEENT, cardiovascular, pulmonary, gi and gu systems are negative, except as otherwise noted.      Objective    /67 (BP Location: Right arm, Patient Position: Sitting, Cuff Size: Adult Regular)   Pulse 82   Temp 97.9  F (36.6  C) (Tympanic)   Ht 1.588 m (5' 2.5\")   Wt 60.7 kg (133 lb 12.8 oz)   SpO2 98%   BMI 24.08 kg/m    Body mass index is 24.08 kg/m .  Physical Exam   GENERAL: healthy, alert and no distress  MS: no gross musculoskeletal defects noted, no edema  SKIN: no suspicious lesions or rashes  PSYCH: mentation appears normal, affect normal/bright    "

## 2021-02-02 NOTE — PROGRESS NOTES
"Zainab Sykes is a 25 year old female  No LMP recorded. Patient has had an implant.After 1+ years of Nexplanon as a contraceptive, she was here today for its removal. Patient was seen in January for breakthrough bleeding. Current implant was inserted 2019 and had been having breakthrough bleeding. Started on PO Estradiol and bleeding persisted and is heavy with cramping and clots. Unsure if she would like new contraception. Patient medical, surgical, social, and family history reviewed and updated. ROS: 10 point ROS neg other than the symptoms noted above in the HPI.    Zainab was counseled about removal. She voiced her understanding and informed consent was obtained.    /67 (BP Location: Right arm, Patient Position: Sitting, Cuff Size: Adult Regular)   Pulse 82   Temp 97.9  F (36.6  C) (Tympanic)   Ht 1.588 m (5' 2.5\")   Wt 60.7 kg (133 lb 12.8 oz)   SpO2 98%   BMI 24.08 kg/m     This is a well appearing female in no acute distress. Answers questions and maintains eye contact appropriately.     PROCEDURE:  Patient was placed in a supine position. Right arm was flexed at the elbow and externally rotated. The implant was located by palpation and marked at the end closest to the elbow with a sterile marker.     The area was cleaned and antiseptic applied. 1cc of 1% Lidocaine was injected just underneath the end of the implant closest to the elbow.  Light pressure was applied on the end of the implant closest to the elbow and a 2 mm incision was made in the longitudinal direction of the arm at the tip of the implant closest to the elbow. The implant was gently pushed toward the incision until the tip was visible and was grasped with sterile mosquito foreceps and gently removed.  The incision was closed with a butterfly closure and an adhesive bandage applied. A sterile gauze with pressure bandage was also applied.    Aseptic conditions were maintained throughout the procedure. The patient " tolerated the procedure well.  No apparent complications. Follow up as needed.    Sia URIBE CNP

## 2021-04-17 ENCOUNTER — HEALTH MAINTENANCE LETTER (OUTPATIENT)
Age: 26
End: 2021-04-17

## 2021-09-26 ENCOUNTER — HEALTH MAINTENANCE LETTER (OUTPATIENT)
Age: 26
End: 2021-09-26

## 2022-05-08 ENCOUNTER — HEALTH MAINTENANCE LETTER (OUTPATIENT)
Age: 27
End: 2022-05-08

## 2023-01-08 ENCOUNTER — HEALTH MAINTENANCE LETTER (OUTPATIENT)
Age: 28
End: 2023-01-08

## 2023-04-25 ENCOUNTER — TRANSFERRED RECORDS (OUTPATIENT)
Dept: MIDWIFE SERVICES | Facility: CLINIC | Age: 28
End: 2023-04-25

## 2023-04-25 ENCOUNTER — TELEPHONE (OUTPATIENT)
Dept: MIDWIFE SERVICES | Facility: CLINIC | Age: 28
End: 2023-04-25

## 2023-04-25 ENCOUNTER — OFFICE VISIT (OUTPATIENT)
Dept: MIDWIFE SERVICES | Facility: CLINIC | Age: 28
End: 2023-04-25
Payer: COMMERCIAL

## 2023-04-25 VITALS
DIASTOLIC BLOOD PRESSURE: 60 MMHG | HEART RATE: 60 BPM | HEIGHT: 63 IN | WEIGHT: 146 LBS | SYSTOLIC BLOOD PRESSURE: 108 MMHG | BODY MASS INDEX: 25.87 KG/M2

## 2023-04-25 DIAGNOSIS — N91.2 AMENORRHEA: Primary | ICD-10-CM

## 2023-04-25 LAB
ALBUMIN SERPL BCG-MCNC: 4.6 G/DL (ref 3.5–5.2)
ALP SERPL-CCNC: 58 U/L (ref 35–104)
ALT SERPL W P-5'-P-CCNC: 7 U/L (ref 10–35)
ANION GAP SERPL CALCULATED.3IONS-SCNC: 10 MMOL/L (ref 7–15)
AST SERPL W P-5'-P-CCNC: 21 U/L (ref 10–35)
BASOPHILS # BLD AUTO: 0.1 10E3/UL (ref 0–0.2)
BASOPHILS NFR BLD AUTO: 1 %
BILIRUB SERPL-MCNC: 0.5 MG/DL
BUN SERPL-MCNC: 12.6 MG/DL (ref 6–20)
CALCIUM SERPL-MCNC: 9.7 MG/DL (ref 8.6–10)
CHLORIDE SERPL-SCNC: 107 MMOL/L (ref 98–107)
CORTIS SERPL-MCNC: 5.5 UG/DL
CREAT SERPL-MCNC: 0.9 MG/DL (ref 0.51–0.95)
DEPRECATED HCO3 PLAS-SCNC: 23 MMOL/L (ref 22–29)
EOSINOPHIL # BLD AUTO: 0.1 10E3/UL (ref 0–0.7)
EOSINOPHIL NFR BLD AUTO: 1 %
ERYTHROCYTE [DISTWIDTH] IN BLOOD BY AUTOMATED COUNT: 12.8 % (ref 10–15)
FSH SERPL IRP2-ACNC: 3.1 MIU/ML
GFR SERPL CREATININE-BSD FRML MDRD: >90 ML/MIN/1.73M2
GLUCOSE SERPL-MCNC: 96 MG/DL (ref 70–99)
HBA1C MFR BLD: 5 % (ref 0–5.6)
HCG INTACT+B SERPL-ACNC: <1 MIU/ML
HCG UR QL: NEGATIVE
HCT VFR BLD AUTO: 36.9 % (ref 35–47)
HGB BLD-MCNC: 13 G/DL (ref 11.7–15.7)
IMM GRANULOCYTES # BLD: 0 10E3/UL
IMM GRANULOCYTES NFR BLD: 0 %
LH SERPL-ACNC: 9.7 MIU/ML
LYMPHOCYTES # BLD AUTO: 2.5 10E3/UL (ref 0.8–5.3)
LYMPHOCYTES NFR BLD AUTO: 33 %
MCH RBC QN AUTO: 31.1 PG (ref 26.5–33)
MCHC RBC AUTO-ENTMCNC: 35.2 G/DL (ref 31.5–36.5)
MCV RBC AUTO: 88 FL (ref 78–100)
MONOCYTES # BLD AUTO: 0.4 10E3/UL (ref 0–1.3)
MONOCYTES NFR BLD AUTO: 5 %
NEUTROPHILS # BLD AUTO: 4.4 10E3/UL (ref 1.6–8.3)
NEUTROPHILS NFR BLD AUTO: 60 %
PLATELET # BLD AUTO: 258 10E3/UL (ref 150–450)
POTASSIUM SERPL-SCNC: 4.3 MMOL/L (ref 3.4–5.3)
PROLACTIN SERPL 3RD IS-MCNC: 16 NG/ML (ref 5–23)
PROT SERPL-MCNC: 7.5 G/DL (ref 6.4–8.3)
RBC # BLD AUTO: 4.18 10E6/UL (ref 3.8–5.2)
SODIUM SERPL-SCNC: 140 MMOL/L (ref 136–145)
T4 FREE SERPL-MCNC: 0.86 NG/DL (ref 0.9–1.7)
TSH SERPL DL<=0.005 MIU/L-ACNC: 4.61 UIU/ML (ref 0.3–4.2)
WBC # BLD AUTO: 7.4 10E3/UL (ref 4–11)

## 2023-04-25 PROCEDURE — 84439 ASSAY OF FREE THYROXINE: CPT | Performed by: MIDWIFE

## 2023-04-25 PROCEDURE — 83036 HEMOGLOBIN GLYCOSYLATED A1C: CPT | Performed by: MIDWIFE

## 2023-04-25 PROCEDURE — 84146 ASSAY OF PROLACTIN: CPT | Performed by: MIDWIFE

## 2023-04-25 PROCEDURE — 81025 URINE PREGNANCY TEST: CPT | Performed by: MIDWIFE

## 2023-04-25 PROCEDURE — 84403 ASSAY OF TOTAL TESTOSTERONE: CPT | Performed by: MIDWIFE

## 2023-04-25 PROCEDURE — 84702 CHORIONIC GONADOTROPIN TEST: CPT | Performed by: MIDWIFE

## 2023-04-25 PROCEDURE — 99214 OFFICE O/P EST MOD 30 MIN: CPT | Performed by: MIDWIFE

## 2023-04-25 PROCEDURE — 83002 ASSAY OF GONADOTROPIN (LH): CPT | Performed by: MIDWIFE

## 2023-04-25 PROCEDURE — 36415 COLL VENOUS BLD VENIPUNCTURE: CPT | Performed by: MIDWIFE

## 2023-04-25 PROCEDURE — 82533 TOTAL CORTISOL: CPT | Performed by: MIDWIFE

## 2023-04-25 PROCEDURE — 83001 ASSAY OF GONADOTROPIN (FSH): CPT | Performed by: MIDWIFE

## 2023-04-25 PROCEDURE — 80050 GENERAL HEALTH PANEL: CPT | Performed by: MIDWIFE

## 2023-04-25 NOTE — PROGRESS NOTES
S/O: Pt is a new patient to the [facility] midwives.    She presents to [facility] today because she has not had a menstrual period since January.  Her home UPT reads negative.    Today in the clinic her UPT is negative as well..    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   Please see family history section.  Includes father's death at age 63 about a year and a half ago December 2021 and patient is grieving this.  Patient states that she did not have any resolve regarding her father's status.  He was young, he lived alone, patient was sent to the house with a [employer] worker and found him dead on a welfare check.  Patient's mother has thyroid disorder.  General health/lifestyle:   Patient states she always wears a seatbelt.  There are no firearms in the home   There are working fire detectors in the home.  No smoking or tobacco use.   In a typical week, exercise includes: Daily walking  Diet: Clean, eats meat, gets a lot of dairy.  In a typical week she drinks 1 or 2 (but none recently) alcoholic drinks.  No recreational drug use.   No physical, sexual or emotional abuse.   She is in a safe, monogamous relationship with her .   Sexual history/family planning: Currently hoping for pregnancy but not actively trying per se, not doing anything to prevent pregnancy.  Last birth control used was August 2022  For birth control she uses: None now, in the past she has used Implanon, and OCPs.  Last used August 2022  Patient is sexually active with her  only.   Types of sexual activity practiced: Vaginal, oral, touch with hands  Last menstrual period: January 10, 2023  Her periods are very irregular.  Last approximately 6 days.  They are usually heavy and crampy flow.  Recent menstrual history includes menses October 18 through 24, December 1 through 7, January 10 through 16.  PMS  "includes mood swings, bloating, acne, cramping.  Patient does not want STI testing today.  (In the past patient had chlamydia x1 a long time ago)  Review of systems: Patient has difficulty losing weight, very tired, some neck and back discomfort, has \"raw skin on her arms and uses CeraVe a lotion to help with this.  Patient complaining about hairs growing on her chin and acne as well.    A: Patient here with amenorrhea since January (3 months)  Hypothyroid (new diagnosis from labs today, and patient will start thyroid medicine)  Possible PCOS--work-up in progress  Grief--father  a year and a half ago and there is no resolve as to cause of death.  Elevated BMI of 26    P: 1.  UPT negative  2.  Labs done include: CBC, CMP, cortisol, FSH, beta-hCG, hemoglobin A1c, LH, prolactin, T4 free, TSH, total testosterone  3.  Ultrasound non-OB pelvic (to look for signs of PCOS)  4.  Consider Provera challenge if no menstrual cycle for 3 months.  Addendum: Patient called 2023 with news that her menstrual cycle started.  Discussed that she should keep this knowledge for future notice and if she is 3 months without a menstrual cycle, she should call the CNM's.  5.  Preconception counseling-- just started discussing, with recommendations for prenatal supplements, optimal health including diet and exercise.  Getting her health in order in terms of getting to the bottom of her menstrual cycle issues and now new diagnosis of hypothyroid stabilized.  6.  Recommended therapy for grief of father's death, unresolved.  Resources given  7.  Addendum: Lab results show overall start thyroid medication/supplementation as per telephone conversation with Darleen Sarmiento CNM.  8.  Patient states that she is due for an annual exam and Pap.  Will return to clinic to synthesize results and have an annual exam and Pap.    Total time with patient 35 minutes: Time spent discussing symptoms, chart review, documentation, counseling regarding " possible PCOS and symptoms, discussing preconception issues, general health optimization for maximizing fertility, analyzing labs.  Kyra DUENASM, APRN

## 2023-04-25 NOTE — TELEPHONE ENCOUNTER
PHONE NOTE: Patient calls clinic midwife.  She was seen earlier this morning in the clinic and had some questions about her lab results and plan.  We reviewed that some of the labs are reported and some are not done yet, still pending.  Also gave her information about a non-OB pelvic transvaginal ultrasound that she should schedule and reviewed phone number.  Stated that she needs to have an annual exam in 1 to 2 weeks and then we can synthesize all the information and make a plan for her.  Discussed that we are thinking that she might have PCOS, but that no formal diagnosis has been made yet.  Still considering Provera challenge for 10 days since she has not had a period since January.  We will call patient back with information regarding this.

## 2023-04-26 ENCOUNTER — TELEPHONE (OUTPATIENT)
Dept: MIDWIFE SERVICES | Facility: CLINIC | Age: 28
End: 2023-04-26
Payer: COMMERCIAL

## 2023-04-26 DIAGNOSIS — E03.8 OTHER SPECIFIED HYPOTHYROIDISM: ICD-10-CM

## 2023-04-26 DIAGNOSIS — E03.9 HYPOTHYROIDISM, UNSPECIFIED TYPE: Primary | ICD-10-CM

## 2023-04-26 RX ORDER — LEVOTHYROXINE SODIUM 100 UG/1
100 TABLET ORAL DAILY
Qty: 30 TABLET | Refills: 3 | Status: SHIPPED | OUTPATIENT
Start: 2023-04-26 | End: 2023-08-21

## 2023-04-26 NOTE — TELEPHONE ENCOUNTER
"A: 1.  Hypothyroidism    P: This writer calculated 1.6 mcg/kg (66) and levothyroxine 100 mcg p.o. daily, #30, 3 refills sent to preferred pharmacy.  Recommend follow-up lab work in 6 to 8 weeks including TSH and free T4.  Will adjust levothyroxine dosage based on lab results in the future.  All questions answered.    S: Patient recently seen for amenorrhea.  \"I have not felt myself in years.\"  Patient reports a strong family history of hypothyroidism.    O: Alert and in no apparent distress  Labs on 4/25/2023:  Free T4: 0.  8 6 (L)  TSH: 4.61 (H)  "

## 2023-04-26 NOTE — TELEPHONE ENCOUNTER
Pt just missed a call from COLETTE to go over some results. Pt is at work but if COLETTE can call back before 10:30am she should be able to answer the call.

## 2023-04-26 NOTE — TELEPHONE ENCOUNTER
Please see note regarding telephone call, discussion of lab results, prescription initiation, and follow-up labs.

## 2023-04-27 ENCOUNTER — TELEPHONE (OUTPATIENT)
Dept: MIDWIFE SERVICES | Facility: CLINIC | Age: 28
End: 2023-04-27
Payer: COMMERCIAL

## 2023-04-27 PROBLEM — N91.2 AMENORRHEA: Status: ACTIVE | Noted: 2023-04-27

## 2023-04-27 LAB — TESTOST SERPL-MCNC: 23 NG/DL (ref 8–60)

## 2023-04-27 NOTE — TELEPHONE ENCOUNTER
Pt is scheduled for an U/S this afternoon but has a couple questions for the Midwife. She has mostly been working with CF and COLETTE.

## 2023-04-27 NOTE — TELEPHONE ENCOUNTER
PHONE NOTE: this CNM called patient back regarding her questions.  Patient states that she is happy to report that she just started her menses today.  But, it is heavy and crampy and she is somewhat miserable with this.  Recommended ibuprofen, warm packs, warm bath, rest.  Patient wondered if she should keep her appointment for her pelvic ultrasound this afternoon.  (I checked in with Dr. Vargas just to see if there is an optimal time during her menstrual cycle for this ultrasound and she said anytime is fine.)  So when I called patient back I told her that it was up to her.  That is was not an urgent situation and so it would be fine for her to wait 1 or 2 weeks if she would like to reschedule her ultrasound.  Patient has been in conversation with another CNM regarding her thyroid labs and getting her started on some supplemental thyroid meds.  There is a plan in place to get her stabilized.  Discussed that this may help her lose weight.  And everything else might come into line then.  We discussed that we will help her synthesize all of the information and come up with a plan for health and optimizing her fertility if possible.  She is due for an annual exam and Pap and will make an appointment for this as well.  Patient states that since getting  last  she has gained about 30 pounds and it is very difficult for her to take off the weight.  Maybe helping her with her stabilization of her thyroid will remedy this.  Patient also mentions that she is grieving the death of her father which occurred about a year and a half ago.  Very sudden, he is 63 years old and they are not quite sure what he  of.  He was living alone.  Patient was sent to his apartment for a welfare check with someone from the [facility] and she found him dead.  Nobody has any answers for her.  There were no signs of foul play and patient states that nothing in blood work or autopsy gave him any answers.  She does not have closure for  this.  Recommend therapy.  Plan: 1.  Patient will start on prenatal supplements, exercise, healthy diet, avoiding toxins, and keeping close track of her menstrual cycle.  2.  Patient will change her pelvic ultrasound appointment to next week.  3.We will continue to follow her T4 free and TSH thyroid levels and adjust her meds accordingly.    4.  Patient will make an appointment for annual exam and Pap.  5.  Recommend talk therapy for grief of father's death and the unresolved situation.    6.  Patient agrees with plan    Total time with patient 30 minutes on phone

## 2023-05-05 ENCOUNTER — ANCILLARY ORDERS (OUTPATIENT)
Dept: MIDWIFE SERVICES | Facility: CLINIC | Age: 28
End: 2023-05-05

## 2023-05-05 ENCOUNTER — HOSPITAL ENCOUNTER (OUTPATIENT)
Dept: ULTRASOUND IMAGING | Facility: HOSPITAL | Age: 28
Discharge: HOME | End: 2023-05-05
Attending: MIDWIFE | Admitting: MIDWIFE
Payer: COMMERCIAL

## 2023-05-05 DIAGNOSIS — N91.2 AMENORRHEA: ICD-10-CM

## 2023-05-05 PROCEDURE — 76856 US EXAM PELVIC COMPLETE: CPT

## 2023-05-09 ENCOUNTER — TELEPHONE (OUTPATIENT)
Dept: MIDWIFE SERVICES | Facility: CLINIC | Age: 28
End: 2023-05-09
Payer: COMMERCIAL

## 2023-05-09 DIAGNOSIS — E28.2 PCOS (POLYCYSTIC OVARIAN SYNDROME): Primary | ICD-10-CM

## 2023-05-09 NOTE — CONFIDENTIAL NOTE
Phone call to Zainab and questions answered about suspected PCOS diagnosis given after recent pelvic ultrasound. Accepts referral to OBGYN for further evaluation and management.     RONY Rios, CNM, IBCLC  [facility]  Midwifery

## 2023-06-02 ENCOUNTER — HEALTH MAINTENANCE LETTER (OUTPATIENT)
Age: 28
End: 2023-06-02

## 2023-06-09 ENCOUNTER — LAB REQUISITION (OUTPATIENT)
Dept: LAB | Facility: CLINIC | Age: 28
End: 2023-06-09
Payer: COMMERCIAL

## 2023-06-09 ENCOUNTER — TRANSFERRED RECORDS (OUTPATIENT)
Dept: HEALTH INFORMATION MANAGEMENT | Facility: CLINIC | Age: 28
End: 2023-06-09
Payer: COMMERCIAL

## 2023-06-09 ENCOUNTER — LAB REQUISITION (OUTPATIENT)
Dept: LAB | Facility: CLINIC | Age: 28
End: 2023-06-09

## 2023-06-09 DIAGNOSIS — E28.2 POLYCYSTIC OVARIAN SYNDROME: ICD-10-CM

## 2023-06-09 LAB
CHOLEST SERPL-MCNC: 143 MG/DL
HDLC SERPL-MCNC: 74 MG/DL
LDLC SERPL CALC-MCNC: 61 MG/DL
NONHDLC SERPL-MCNC: 69 MG/DL
PROGEST SERPL-MCNC: 0.3 NG/ML
TRIGL SERPL-MCNC: 42 MG/DL

## 2023-06-09 PROCEDURE — 83498 ASY HYDROXYPROGESTERONE 17-D: CPT | Mod: ORL | Performed by: OBSTETRICS & GYNECOLOGY

## 2023-06-09 PROCEDURE — 80061 LIPID PANEL: CPT | Performed by: OBSTETRICS & GYNECOLOGY

## 2023-06-09 PROCEDURE — 84144 ASSAY OF PROGESTERONE: CPT | Performed by: OBSTETRICS & GYNECOLOGY

## 2023-06-09 PROCEDURE — 83520 IMMUNOASSAY QUANT NOS NONAB: CPT | Performed by: OBSTETRICS & GYNECOLOGY

## 2023-06-10 ENCOUNTER — LAB REQUISITION (OUTPATIENT)
Dept: LAB | Facility: CLINIC | Age: 28
End: 2023-06-10
Payer: COMMERCIAL

## 2023-06-10 DIAGNOSIS — E28.2 POLYCYSTIC OVARIAN SYNDROME: ICD-10-CM

## 2023-06-10 LAB — MIS SERPL-MCNC: 7.67 NG/ML (ref 0.89–9.9)

## 2023-06-15 LAB — 17OHP SERPL-MCNC: 52 NG/DL

## 2023-08-21 DIAGNOSIS — E03.9 HYPOTHYROIDISM, UNSPECIFIED TYPE: ICD-10-CM

## 2023-08-21 RX ORDER — LEVOTHYROXINE SODIUM 100 UG/1
100 TABLET ORAL DAILY
Qty: 30 TABLET | Refills: 3 | Status: SHIPPED | OUTPATIENT
Start: 2023-08-21

## 2023-08-21 NOTE — TELEPHONE ENCOUNTER
Incoming faxed refill request received from [facility] in [place].  Medication requested:    Pending Prescriptions:                       Disp   Refills    levothyroxine (SYNTHROID/LEVOTHROID) 100 *30 tab*3            Sig: Take 1 tablet (100 mcg) by mouth daily    Medication last prescribed: 4/26/23  Last visit with [facility] CNM group: 4/25/23  Upcoming appointment(s): None    Refill request sent to on-call CNM for review.

## 2023-11-24 DIAGNOSIS — E03.9 HYPOTHYROIDISM, UNSPECIFIED TYPE: ICD-10-CM

## 2023-11-24 RX ORDER — LEVOTHYROXINE SODIUM 100 UG/1
100 TABLET ORAL DAILY
Qty: 30 TABLET | Refills: 3 | OUTPATIENT
Start: 2023-11-24

## 2023-12-17 ENCOUNTER — HOSPITAL ENCOUNTER (EMERGENCY)
Facility: CLINIC | Age: 28
Discharge: HOME | End: 2023-12-17
Attending: EMERGENCY MEDICINE | Admitting: EMERGENCY MEDICINE
Payer: COMMERCIAL

## 2023-12-17 VITALS
OXYGEN SATURATION: 99 % | RESPIRATION RATE: 20 BRPM | TEMPERATURE: 97.9 F | DIASTOLIC BLOOD PRESSURE: 66 MMHG | HEART RATE: 120 BPM | SYSTOLIC BLOOD PRESSURE: 103 MMHG

## 2023-12-17 DIAGNOSIS — O21.0 HYPEREMESIS GRAVIDARUM: ICD-10-CM

## 2023-12-17 LAB
ANION GAP SERPL CALCULATED.3IONS-SCNC: 15 MMOL/L (ref 7–15)
BASOPHILS # BLD AUTO: 0 10E3/UL (ref 0–0.2)
BASOPHILS NFR BLD AUTO: 0 %
BUN SERPL-MCNC: 7.6 MG/DL (ref 6–20)
CALCIUM SERPL-MCNC: 9 MG/DL (ref 8.6–10)
CHLORIDE SERPL-SCNC: 100 MMOL/L (ref 98–107)
CREAT SERPL-MCNC: 0.58 MG/DL (ref 0.51–0.95)
DEPRECATED HCO3 PLAS-SCNC: 19 MMOL/L (ref 22–29)
EGFRCR SERPLBLD CKD-EPI 2021: >90 ML/MIN/1.73M2
EOSINOPHIL # BLD AUTO: 0 10E3/UL (ref 0–0.7)
EOSINOPHIL NFR BLD AUTO: 0 %
ERYTHROCYTE [DISTWIDTH] IN BLOOD BY AUTOMATED COUNT: 12.7 % (ref 10–15)
GLUCOSE SERPL-MCNC: 100 MG/DL (ref 70–99)
HCT VFR BLD AUTO: 34.1 % (ref 35–47)
HGB BLD-MCNC: 12.3 G/DL (ref 11.7–15.7)
HOLD SPECIMEN: NORMAL
HOLD SPECIMEN: NORMAL
IMM GRANULOCYTES # BLD: 0 10E3/UL
IMM GRANULOCYTES NFR BLD: 0 %
LYMPHOCYTES # BLD AUTO: 0.5 10E3/UL (ref 0.8–5.3)
LYMPHOCYTES NFR BLD AUTO: 5 %
MCH RBC QN AUTO: 30.6 PG (ref 26.5–33)
MCHC RBC AUTO-ENTMCNC: 36.1 G/DL (ref 31.5–36.5)
MCV RBC AUTO: 85 FL (ref 78–100)
MONOCYTES # BLD AUTO: 0.4 10E3/UL (ref 0–1.3)
MONOCYTES NFR BLD AUTO: 4 %
NEUTROPHILS # BLD AUTO: 8.1 10E3/UL (ref 1.6–8.3)
NEUTROPHILS NFR BLD AUTO: 91 %
NRBC # BLD AUTO: 0 10E3/UL
NRBC BLD AUTO-RTO: 0 /100
PLATELET # BLD AUTO: 232 10E3/UL (ref 150–450)
POTASSIUM SERPL-SCNC: 3.5 MMOL/L (ref 3.4–5.3)
RBC # BLD AUTO: 4.02 10E6/UL (ref 3.8–5.2)
SODIUM SERPL-SCNC: 134 MMOL/L (ref 135–145)
WBC # BLD AUTO: 9 10E3/UL (ref 4–11)

## 2023-12-17 PROCEDURE — 96361 HYDRATE IV INFUSION ADD-ON: CPT

## 2023-12-17 PROCEDURE — 250N000013 HC RX MED GY IP 250 OP 250 PS 637: Performed by: EMERGENCY MEDICINE

## 2023-12-17 PROCEDURE — 99283 EMERGENCY DEPT VISIT LOW MDM: CPT | Mod: 25

## 2023-12-17 PROCEDURE — 85014 HEMATOCRIT: CPT | Performed by: EMERGENCY MEDICINE

## 2023-12-17 PROCEDURE — 258N000003 HC RX IP 258 OP 636: Performed by: EMERGENCY MEDICINE

## 2023-12-17 PROCEDURE — 36415 COLL VENOUS BLD VENIPUNCTURE: CPT | Performed by: EMERGENCY MEDICINE

## 2023-12-17 PROCEDURE — 80048 BASIC METABOLIC PNL TOTAL CA: CPT | Performed by: EMERGENCY MEDICINE

## 2023-12-17 PROCEDURE — 96360 HYDRATION IV INFUSION INIT: CPT

## 2023-12-17 RX ORDER — ACETAMINOPHEN 500 MG
1000 TABLET ORAL ONCE
Status: COMPLETED | OUTPATIENT
Start: 2023-12-17 | End: 2023-12-17

## 2023-12-17 RX ADMIN — SODIUM CHLORIDE 1000 ML: 9 INJECTION, SOLUTION INTRAVENOUS at 08:20

## 2023-12-17 RX ADMIN — ACETAMINOPHEN 1000 MG: 500 TABLET, FILM COATED ORAL at 09:51

## 2023-12-17 ASSESSMENT — ACTIVITIES OF DAILY LIVING (ADL): ADLS_ACUITY_SCORE: 33

## 2023-12-17 NOTE — ED PROVIDER NOTES
History     Chief Complaint:  Emesis During Pregnancy and Back Pain     HPI   Zainab Mar is a  28 year old female who is currently is 12 weeks pregnant and presents with emesis during pregnancy and back pain. Patient reports experiencing bad nausea for the past 6 weeks. She reports not being able to keep most food down. Reports trying to take dramamine but has had no relief. She also reports some constipation and only having small bowel movements every few days. She has a history of sciatica and has been having this pain more since she had falling down the stairs about a week ago but reports only falling on her butt. She describes her back pain as in her tailbone and tingling and numbness going down her legs.     Independent Historian:    None.    Medications:    Levothyroxine     Past Medical History:    Anxiety  Depression  PCOS     Past Surgical History:    [place] teeth extraction      Physical Exam   Patient Vitals for the past 24 hrs:   BP Temp Temp src Pulse Resp SpO2   23 0807 103/66 97.9  F (36.6  C) Temporal 120 20 99 %      Physical Exam  General: Patient is alert and cooperative.  Appears tired.  HENT:  moist oral mucosa.  Eyes: EOMI. Normal conjunctiva.  Neck:  Normal range of motion and appearance.   Cardiovascular:  tachycardic, regular.   Pulmonary/Chest:  Effort normal.   Abdominal: Soft. No distension or tenderness.     Musculoskeletal: Normal range of motion. No edema or tenderness.   Neurological: oriented, normal strength, sensation, and coordination.   Skin: Warm and dry.   Psychiatric: Normal mood and affect. Normal behavior and judgement.    Emergency Department Course   Laboratory:  Labs Ordered and Resulted from Time of ED Arrival to Time of ED Departure   BASIC METABOLIC PANEL - Abnormal       Result Value    Sodium 134 (*)     Potassium 3.5      Chloride 100      Carbon Dioxide (CO2) 19 (*)     Anion Gap 15      Urea Nitrogen 7.6      Creatinine 0.58      GFR Estimate >90       Calcium 9.0      Glucose 100 (*)    CBC WITH PLATELETS AND DIFFERENTIAL - Abnormal    WBC Count 9.0      RBC Count 4.02      Hemoglobin 12.3      Hematocrit 34.1 (*)     MCV 85      MCH 30.6      MCHC 36.1      RDW 12.7      Platelet Count 232      % Neutrophils 91      % Lymphocytes 5      % Monocytes 4      % Eosinophils 0      % Basophils 0      % Immature Granulocytes 0      NRBCs per 100 WBC 0      Absolute Neutrophils 8.1      Absolute Lymphocytes 0.5 (*)     Absolute Monocytes 0.4      Absolute Eosinophils 0.0      Absolute Basophils 0.0      Absolute Immature Granulocytes 0.0      Absolute NRBCs 0.0        Emergency Department Course & Assessments:     Interventions:  Medications   sodium chloride 0.9% BOLUS 1,000 mL (0 mLs Intravenous Stopped 23 1041)   acetaminophen (TYLENOL) tablet 1,000 mg (1,000 mg Oral $Given 23 0993)      Assessments:  928 I obtained history and examined the patient as noted above.     Social Determinants of Health affecting care: None    Disposition:  The patient was discharged to home.     Impression & Plan    Medical Decision Making:    Afebrile  12 weeks gestational age was been experiencing frequent nausea and vomiting for the past 6 weeks.  She is set to establish prenatal care tomorrow.  She has had no abdominal cramping or vaginal bleeding, fever or diarrhea.  An IV was established and she was hydrated with 2 L of normal saline.  Basic labs were unremarkable.  She is also complaining of some tailbone pain from a slip and fall recently as well as some sciatic nerve pain which she has had in the past.  Her neurologic exam is normal.  There is no indication for emergent imaging.  She was offered an antiemetic both here in the ER and has a prescription but is declining both.  She is now well-hydrated and well-appearing I have recommended as needed Tylenol for her back discomfort and she will be seen tomorrow to establish prenatal care.,    Diagnosis:     ICD-10-CM    1. Hyperemesis gravidarum  O21.0          Scribe Disclosure:  I, Park Tony, am serving as a scribe at 9:56 AM on 12/17/2023 to document services personally performed by Livan Medina MD based on my observations and the provider's statements to me.    Scribe Disclosure:  I, Brea Ariane, am serving as a scribe  at 9:56 AM on 12/17/2023 to document services personally performed by Livan Medina MD based on my observations and the provider's statements to me.    12/17/2023   Livan Medina MD Isaacson, Brian A, MD  12/17/23 122

## 2023-12-17 NOTE — ED TRIAGE NOTES
Patient reports emesis with pregnancy for about 6 weeks.  She reports being about 12 weeks pregnant.  She also reports back pain that is starting to radiate down her legs.  ABCs intact, A&Ox4.     Triage Assessment (Adult)       Row Name 12/17/23 0808          Triage Assessment    Airway WDL WDL        Respiratory WDL    Respiratory WDL WDL        Skin Circulation/Temperature WDL    Skin Circulation/Temperature WDL WDL        Cardiac WDL    Cardiac WDL WDL        Peripheral/Neurovascular WDL    Peripheral Neurovascular WDL WDL        Cognitive/Neuro/Behavioral WDL    Cognitive/Neuro/Behavioral WDL WDL

## 2024-01-10 ENCOUNTER — LAB REQUISITION (OUTPATIENT)
Dept: LAB | Facility: CLINIC | Age: 29
End: 2024-01-10
Payer: COMMERCIAL

## 2024-01-10 DIAGNOSIS — Z36.9 ENCOUNTER FOR ANTENATAL SCREENING, UNSPECIFIED: ICD-10-CM

## 2024-01-10 DIAGNOSIS — E03.9 HYPOTHYROIDISM, UNSPECIFIED: ICD-10-CM

## 2024-01-10 LAB
BASOPHILS # BLD AUTO: 0.1 10E3/UL (ref 0–0.2)
BASOPHILS NFR BLD AUTO: 1 %
EOSINOPHIL # BLD AUTO: 0.1 10E3/UL (ref 0–0.7)
EOSINOPHIL NFR BLD AUTO: 1 %
ERYTHROCYTE [DISTWIDTH] IN BLOOD BY AUTOMATED COUNT: 13.1 % (ref 10–15)
HCT VFR BLD AUTO: 36.1 % (ref 35–47)
HGB BLD-MCNC: 12.4 G/DL (ref 11.7–15.7)
HIV 1+2 AB+HIV1 P24 AG SERPL QL IA: NONREACTIVE
IMM GRANULOCYTES # BLD: 0.1 10E3/UL
IMM GRANULOCYTES NFR BLD: 1 %
LYMPHOCYTES # BLD AUTO: 2.9 10E3/UL (ref 0.8–5.3)
LYMPHOCYTES NFR BLD AUTO: 22 %
MCH RBC QN AUTO: 30.8 PG (ref 26.5–33)
MCHC RBC AUTO-ENTMCNC: 34.3 G/DL (ref 31.5–36.5)
MCV RBC AUTO: 90 FL (ref 78–100)
MONOCYTES # BLD AUTO: 0.6 10E3/UL (ref 0–1.3)
MONOCYTES NFR BLD AUTO: 5 %
NEUTROPHILS # BLD AUTO: 9.2 10E3/UL (ref 1.6–8.3)
NEUTROPHILS NFR BLD AUTO: 70 %
NRBC # BLD AUTO: 0 10E3/UL
NRBC BLD AUTO-RTO: 0 /100
PLATELET # BLD AUTO: 313 10E3/UL (ref 150–450)
RBC # BLD AUTO: 4.03 10E6/UL (ref 3.8–5.2)
WBC # BLD AUTO: 12.9 10E3/UL (ref 4–11)

## 2024-01-10 PROCEDURE — 86762 RUBELLA ANTIBODY: CPT | Performed by: OBSTETRICS & GYNECOLOGY

## 2024-01-10 PROCEDURE — 87340 HEPATITIS B SURFACE AG IA: CPT | Performed by: OBSTETRICS & GYNECOLOGY

## 2024-01-10 PROCEDURE — 87491 CHLMYD TRACH DNA AMP PROBE: CPT | Performed by: OBSTETRICS & GYNECOLOGY

## 2024-01-10 PROCEDURE — 86803 HEPATITIS C AB TEST: CPT | Performed by: OBSTETRICS & GYNECOLOGY

## 2024-01-10 PROCEDURE — 86900 BLOOD TYPING SEROLOGIC ABO: CPT | Performed by: OBSTETRICS & GYNECOLOGY

## 2024-01-10 PROCEDURE — 84439 ASSAY OF FREE THYROXINE: CPT | Performed by: OBSTETRICS & GYNECOLOGY

## 2024-01-10 PROCEDURE — 87086 URINE CULTURE/COLONY COUNT: CPT | Performed by: OBSTETRICS & GYNECOLOGY

## 2024-01-10 PROCEDURE — 87389 HIV-1 AG W/HIV-1&-2 AB AG IA: CPT | Performed by: OBSTETRICS & GYNECOLOGY

## 2024-01-10 PROCEDURE — 86592 SYPHILIS TEST NON-TREP QUAL: CPT | Performed by: OBSTETRICS & GYNECOLOGY

## 2024-01-10 PROCEDURE — 84443 ASSAY THYROID STIM HORMONE: CPT | Performed by: OBSTETRICS & GYNECOLOGY

## 2024-01-10 PROCEDURE — 85025 COMPLETE CBC W/AUTO DIFF WBC: CPT | Performed by: OBSTETRICS & GYNECOLOGY

## 2024-01-11 LAB
ABO/RH(D): NORMAL
ANTIBODY SCREEN: NEGATIVE
C TRACH DNA SPEC QL PROBE+SIG AMP: NEGATIVE
HBV SURFACE AG SERPL QL IA: NONREACTIVE
HCV AB SERPL QL IA: NONREACTIVE
N GONORRHOEA DNA SPEC QL NAA+PROBE: NEGATIVE
RPR SER QL: NONREACTIVE
RUBV IGG SERPL QL IA: 3.51 INDEX
RUBV IGG SERPL QL IA: POSITIVE
SPECIMEN EXPIRATION DATE: NORMAL
T4 FREE SERPL-MCNC: 0.87 NG/DL (ref 0.9–1.7)
TSH SERPL DL<=0.005 MIU/L-ACNC: 5.5 UIU/ML (ref 0.3–4.2)

## 2024-01-12 LAB — BACTERIA UR CULT: NORMAL

## 2024-02-21 ENCOUNTER — LAB REQUISITION (OUTPATIENT)
Dept: LAB | Facility: CLINIC | Age: 29
End: 2024-02-21

## 2024-02-21 DIAGNOSIS — E03.9 HYPOTHYROIDISM, UNSPECIFIED: ICD-10-CM

## 2024-02-21 PROCEDURE — 84443 ASSAY THYROID STIM HORMONE: CPT | Performed by: OBSTETRICS & GYNECOLOGY

## 2024-02-22 LAB — TSH SERPL DL<=0.005 MIU/L-ACNC: 3.43 UIU/ML (ref 0.3–4.2)

## 2024-03-18 ENCOUNTER — LAB REQUISITION (OUTPATIENT)
Dept: LAB | Facility: CLINIC | Age: 29
End: 2024-03-18

## 2024-03-18 DIAGNOSIS — E03.9 HYPOTHYROIDISM, UNSPECIFIED: ICD-10-CM

## 2024-03-18 PROCEDURE — 84443 ASSAY THYROID STIM HORMONE: CPT | Performed by: OBSTETRICS & GYNECOLOGY

## 2024-03-19 LAB — TSH SERPL DL<=0.005 MIU/L-ACNC: 1.59 UIU/ML (ref 0.3–4.2)

## 2024-04-15 ENCOUNTER — HOSPITAL ENCOUNTER (OUTPATIENT)
Facility: HOSPITAL | Age: 29
End: 2024-04-15
Admitting: OBSTETRICS & GYNECOLOGY
Payer: COMMERCIAL

## 2024-04-15 ENCOUNTER — HOSPITAL ENCOUNTER (OUTPATIENT)
Facility: HOSPITAL | Age: 29
Discharge: HOME | End: 2024-04-15
Attending: OBSTETRICS & GYNECOLOGY | Admitting: OBSTETRICS & GYNECOLOGY
Payer: COMMERCIAL

## 2024-04-15 VITALS
WEIGHT: 181.6 LBS | TEMPERATURE: 98.2 F | BODY MASS INDEX: 33.42 KG/M2 | DIASTOLIC BLOOD PRESSURE: 59 MMHG | SYSTOLIC BLOOD PRESSURE: 109 MMHG | RESPIRATION RATE: 16 BRPM | OXYGEN SATURATION: 98 % | HEIGHT: 62 IN

## 2024-04-15 PROBLEM — Z36.89 ENCOUNTER FOR TRIAGE IN PREGNANT PATIENT: Status: ACTIVE | Noted: 2024-04-15

## 2024-04-15 PROCEDURE — G0463 HOSPITAL OUTPT CLINIC VISIT: HCPCS

## 2024-04-15 RX ORDER — LIDOCAINE 40 MG/G
CREAM TOPICAL
Status: DISCONTINUED | OUTPATIENT
Start: 2024-04-15 | End: 2024-04-15 | Stop reason: HOSPADM

## 2024-04-15 ASSESSMENT — ACTIVITIES OF DAILY LIVING (ADL)
ADLS_ACUITY_SCORE: 18

## 2024-04-15 NOTE — PROGRESS NOTES
Data: Zainab Mar a 28 year old  28w2d [facility] to [facility]: 4/15/2024  1:39 PM.  Reason for maternal/fetal assessment is spotting this AM and mild cramping. Patient reports vaginal spotting this AM and lower ABD cramping. VSS. Fetal movement present. Patient denies leaking of vaginal fluid/rupture of membranes, pelvic pressure, nausea, vomiting, headache, visual disturbances, epigastric or RUQ pain, significant edema. Support person is not present.   Action: Verbal consent for EFM and monitors placed upon arrival. Triage assessment completed. Bill of rights reviewed.  Response: Category  appropriate for gestational age  FHR. MD updated to above and orders received: Continue to Monitor Patient verbalized agreement with plan. MD Gonzalez updated on patient's arrival/spotting this AM/no bleeding noted on underwear at arrival, have patient on monitor for 40 minutes to see FHT and contractions, if no contractions on monitor/palpation ok to d/c home  Derrick Clark RN  4/15/2024 2:24 PM

## 2024-04-15 NOTE — DISCHARGE INSTRUCTIONS
EARLY LABOR DISCHARGE INSTRUCTIONS    You were seen for: Labor Assessment  You had (Test or Medicine): fetal heart monitored, monitored vaginal bleeding    Refer to Any Day Now handout for tips on how to labor at home    WHEN TO CALL YOUR PROVIDER:  If this is your first baby: Your contractions (tightening) are 5 minutes apart, last more than 1 minute, and have been consistently getting stronger for 1 hour or more  If this is your second baby or beyond: Your contractions are less than 10 minutes apart and have been consistently getting stronger for 1 hour or more  Feeling your baby move less than usual  Temperature of 100.4 F (38 C) or higher  New fluid leaking from your vagina  Other signs your provider asked you to look for in your body  Vaginal bleeding (bright red blood)  Swelling in your face or more swelling in your hands or legs  Headaches that don't get better after taking Tylenol (acetaminophen)  Changes in your vision (blurry or seeing spots or stars)  Nausea (sick to your stomach) and vomiting (throwing up)  Heartburn that doesn't go away  Sudden, bad belly pain that is unlike your contractions    IF YOU ARRIVED WITH YOUR WATER ALREADY BROKEN (MEMBRANES RUPTURED):  Avoid placing anything in vagina, including intercourse (sex)  Check your temperature every 3 hours when awake  Call your provider/clinic if:  Your temperature is 100.4 F (38 C) or higher  Your fluid becomes not clear or is smelly  Your baby is moving less than usual  You don't go into labor within 24 hours of your water breaking  You have other concerns      FOLLOW UP:  As scheduled in the clinic    Provider/clinic number: [place]

## 2024-04-15 NOTE — PROGRESS NOTES
Data: Patient assessed in the [facility] for abdominal pain and spotting . Cervical exam deferred. Membranes intact. Contractions are not present. See flowsheets for fetal assessment documentation.     Action: Presumed adequate fetal oxygenation documented. Discharge instructions reviewed. Patient instructed to report change in fetal movement, vaginal leaking of fluid or bleeding, abdominal pain, or any concerns related to the pregnancy to provider/clinic.      Response: Orders to discharge home per Carlos. Patient verbalized understanding of education and agreement with plan. Discharged to home at 1525.    Derrick Clark RN

## 2024-04-18 ENCOUNTER — LAB REQUISITION (OUTPATIENT)
Dept: LAB | Facility: CLINIC | Age: 29
End: 2024-04-18

## 2024-04-18 DIAGNOSIS — Z11.3 ENCOUNTER FOR SCREENING FOR INFECTIONS WITH A PREDOMINANTLY SEXUAL MODE OF TRANSMISSION: ICD-10-CM

## 2024-04-18 DIAGNOSIS — O99.019 ANEMIA COMPLICATING PREGNANCY, UNSPECIFIED TRIMESTER: ICD-10-CM

## 2024-04-18 PROCEDURE — 85027 COMPLETE CBC AUTOMATED: CPT | Performed by: NURSE PRACTITIONER

## 2024-04-18 PROCEDURE — 82728 ASSAY OF FERRITIN: CPT | Performed by: NURSE PRACTITIONER

## 2024-04-18 PROCEDURE — 86780 TREPONEMA PALLIDUM: CPT | Performed by: NURSE PRACTITIONER

## 2024-04-19 LAB
ERYTHROCYTE [DISTWIDTH] IN BLOOD BY AUTOMATED COUNT: 13.2 % (ref 10–15)
HCT VFR BLD AUTO: 36.7 % (ref 35–47)
HGB BLD-MCNC: 12.4 G/DL (ref 11.7–15.7)
MCH RBC QN AUTO: 31.3 PG (ref 26.5–33)
MCHC RBC AUTO-ENTMCNC: 33.8 G/DL (ref 31.5–36.5)
MCV RBC AUTO: 93 FL (ref 78–100)
PLATELET # BLD AUTO: 256 10E3/UL (ref 150–450)
RBC # BLD AUTO: 3.96 10E6/UL (ref 3.8–5.2)
T PALLIDUM AB SER QL: NONREACTIVE
WBC # BLD AUTO: 12.8 10E3/UL (ref 4–11)

## 2024-04-20 LAB — FERRITIN SERPL-MCNC: 27 NG/ML (ref 6–175)

## 2024-05-01 ENCOUNTER — LAB REQUISITION (OUTPATIENT)
Dept: LAB | Facility: CLINIC | Age: 29
End: 2024-05-01

## 2024-05-01 DIAGNOSIS — Z13.29 ENCOUNTER FOR SCREENING FOR OTHER SUSPECTED ENDOCRINE DISORDER: ICD-10-CM

## 2024-05-01 PROCEDURE — 84443 ASSAY THYROID STIM HORMONE: CPT | Performed by: OBSTETRICS & GYNECOLOGY

## 2024-05-02 LAB — TSH SERPL DL<=0.005 MIU/L-ACNC: 0.81 UIU/ML (ref 0.3–4.2)

## 2024-05-29 ENCOUNTER — LAB REQUISITION (OUTPATIENT)
Dept: LAB | Facility: CLINIC | Age: 29
End: 2024-05-29

## 2024-05-29 DIAGNOSIS — R60.9 EDEMA, UNSPECIFIED: ICD-10-CM

## 2024-05-29 LAB
BASOPHILS # BLD AUTO: 0.1 10E3/UL (ref 0–0.2)
BASOPHILS NFR BLD AUTO: 0 %
EOSINOPHIL # BLD AUTO: 0.1 10E3/UL (ref 0–0.7)
EOSINOPHIL NFR BLD AUTO: 1 %
ERYTHROCYTE [DISTWIDTH] IN BLOOD BY AUTOMATED COUNT: 13.9 % (ref 10–15)
HCT VFR BLD AUTO: 37.8 % (ref 35–47)
HGB BLD-MCNC: 13.1 G/DL (ref 11.7–15.7)
IMM GRANULOCYTES # BLD: 0.2 10E3/UL
IMM GRANULOCYTES NFR BLD: 2 %
LYMPHOCYTES # BLD AUTO: 1.9 10E3/UL (ref 0.8–5.3)
LYMPHOCYTES NFR BLD AUTO: 14 %
MCH RBC QN AUTO: 31.3 PG (ref 26.5–33)
MCHC RBC AUTO-ENTMCNC: 34.7 G/DL (ref 31.5–36.5)
MCV RBC AUTO: 90 FL (ref 78–100)
MONOCYTES # BLD AUTO: 0.6 10E3/UL (ref 0–1.3)
MONOCYTES NFR BLD AUTO: 5 %
NEUTROPHILS # BLD AUTO: 10.4 10E3/UL (ref 1.6–8.3)
NEUTROPHILS NFR BLD AUTO: 78 %
NRBC # BLD AUTO: 0 10E3/UL
NRBC BLD AUTO-RTO: 0 /100
PLATELET # BLD AUTO: 219 10E3/UL (ref 150–450)
RBC # BLD AUTO: 4.18 10E6/UL (ref 3.8–5.2)
WBC # BLD AUTO: 13.2 10E3/UL (ref 4–11)

## 2024-05-29 PROCEDURE — 82040 ASSAY OF SERUM ALBUMIN: CPT | Performed by: OBSTETRICS & GYNECOLOGY

## 2024-05-29 PROCEDURE — 85025 COMPLETE CBC W/AUTO DIFF WBC: CPT | Performed by: OBSTETRICS & GYNECOLOGY

## 2024-05-30 LAB
ALBUMIN SERPL BCG-MCNC: 3.5 G/DL (ref 3.5–5.2)
ALP SERPL-CCNC: 125 U/L (ref 40–150)
ALT SERPL W P-5'-P-CCNC: 14 U/L (ref 0–50)
ANION GAP SERPL CALCULATED.3IONS-SCNC: 15 MMOL/L (ref 7–15)
AST SERPL W P-5'-P-CCNC: 17 U/L (ref 0–45)
BILIRUB SERPL-MCNC: 0.2 MG/DL
BUN SERPL-MCNC: 7.1 MG/DL (ref 6–20)
CALCIUM SERPL-MCNC: 9.2 MG/DL (ref 8.6–10)
CHLORIDE SERPL-SCNC: 104 MMOL/L (ref 98–107)
CREAT SERPL-MCNC: 0.53 MG/DL (ref 0.51–0.95)
DEPRECATED HCO3 PLAS-SCNC: 16 MMOL/L (ref 22–29)
EGFRCR SERPLBLD CKD-EPI 2021: >90 ML/MIN/1.73M2
GLUCOSE SERPL-MCNC: 135 MG/DL (ref 70–99)
POTASSIUM SERPL-SCNC: 4 MMOL/L (ref 3.4–5.3)
PROT SERPL-MCNC: 5.9 G/DL (ref 6.4–8.3)
SODIUM SERPL-SCNC: 135 MMOL/L (ref 135–145)

## 2024-06-09 ENCOUNTER — HOSPITAL ENCOUNTER (INPATIENT)
Facility: HOSPITAL | Age: 29
LOS: 4 days | Discharge: HOME HEALTH | End: 2024-06-13
Attending: OBSTETRICS & GYNECOLOGY | Admitting: OBSTETRICS & GYNECOLOGY
Payer: COMMERCIAL

## 2024-06-09 PROBLEM — O42.919 PRETERM PREMATURE RUPTURE OF MEMBRANES (PPROM) DELIVERED, CURRENT HOSPITALIZATION: Status: ACTIVE | Noted: 2024-06-09

## 2024-06-09 LAB
ABO/RH(D): NORMAL
ALBUMIN SERPL BCG-MCNC: 3.4 G/DL (ref 3.5–5.2)
ALP SERPL-CCNC: 133 U/L (ref 40–150)
ALT SERPL W P-5'-P-CCNC: 13 U/L (ref 0–50)
ANION GAP SERPL CALCULATED.3IONS-SCNC: 13 MMOL/L (ref 7–15)
ANTIBODY SCREEN: NEGATIVE
AST SERPL W P-5'-P-CCNC: 19 U/L (ref 0–45)
BILIRUB SERPL-MCNC: 0.2 MG/DL
BUN SERPL-MCNC: 11.2 MG/DL (ref 6–20)
CALCIUM SERPL-MCNC: 9.4 MG/DL (ref 8.6–10)
CHLORIDE SERPL-SCNC: 100 MMOL/L (ref 98–107)
CLUE CELLS: ABNORMAL
CREAT SERPL-MCNC: 0.66 MG/DL (ref 0.51–0.95)
DEPRECATED HCO3 PLAS-SCNC: 21 MMOL/L (ref 22–29)
EGFRCR SERPLBLD CKD-EPI 2021: >90 ML/MIN/1.73M2
ERYTHROCYTE [DISTWIDTH] IN BLOOD BY AUTOMATED COUNT: 13.7 % (ref 10–15)
GLUCOSE SERPL-MCNC: 98 MG/DL (ref 70–99)
HCT VFR BLD AUTO: 36.8 % (ref 35–47)
HGB BLD-MCNC: 12.8 G/DL (ref 11.7–15.7)
HOLD SPECIMEN: NORMAL
MCH RBC QN AUTO: 31.1 PG (ref 26.5–33)
MCHC RBC AUTO-ENTMCNC: 34.8 G/DL (ref 31.5–36.5)
MCV RBC AUTO: 90 FL (ref 78–100)
PLATELET # BLD AUTO: 203 10E3/UL (ref 150–450)
POTASSIUM SERPL-SCNC: 3.9 MMOL/L (ref 3.4–5.3)
PROT SERPL-MCNC: 6 G/DL (ref 6.4–8.3)
RBC # BLD AUTO: 4.11 10E6/UL (ref 3.8–5.2)
RUPTURE OF FETAL MEMBRANES BY ROM PLUS: POSITIVE
SODIUM SERPL-SCNC: 134 MMOL/L (ref 135–145)
SPECIMEN EXPIRATION DATE: NORMAL
TRICHOMONAS, WET PREP: ABNORMAL
TSH SERPL DL<=0.005 MIU/L-ACNC: 1.27 UIU/ML (ref 0.3–4.2)
WBC # BLD AUTO: 12.5 10E3/UL (ref 4–11)
WBC'S/HIGH POWER FIELD, WET PREP: ABNORMAL
YEAST, WET PREP: PRESENT

## 2024-06-09 PROCEDURE — 86900 BLOOD TYPING SEROLOGIC ABO: CPT | Performed by: OBSTETRICS & GYNECOLOGY

## 2024-06-09 PROCEDURE — 84112 EVAL AMNIOTIC FLUID PROTEIN: CPT | Performed by: OBSTETRICS & GYNECOLOGY

## 2024-06-09 PROCEDURE — 82040 ASSAY OF SERUM ALBUMIN: CPT | Performed by: OBSTETRICS & GYNECOLOGY

## 2024-06-09 PROCEDURE — 84443 ASSAY THYROID STIM HORMONE: CPT | Performed by: OBSTETRICS & GYNECOLOGY

## 2024-06-09 PROCEDURE — 250N000011 HC RX IP 250 OP 636: Performed by: OBSTETRICS & GYNECOLOGY

## 2024-06-09 PROCEDURE — 87210 SMEAR WET MOUNT SALINE/INK: CPT | Performed by: OBSTETRICS & GYNECOLOGY

## 2024-06-09 PROCEDURE — 85027 COMPLETE CBC AUTOMATED: CPT | Performed by: OBSTETRICS & GYNECOLOGY

## 2024-06-09 PROCEDURE — 87653 STREP B DNA AMP PROBE: CPT | Performed by: OBSTETRICS & GYNECOLOGY

## 2024-06-09 PROCEDURE — 120N000001 HC R&B MED SURG/OB

## 2024-06-09 PROCEDURE — 86780 TREPONEMA PALLIDUM: CPT | Performed by: OBSTETRICS & GYNECOLOGY

## 2024-06-09 PROCEDURE — 36415 COLL VENOUS BLD VENIPUNCTURE: CPT | Performed by: OBSTETRICS & GYNECOLOGY

## 2024-06-09 RX ORDER — MISOPROSTOL 200 UG/1
800 TABLET ORAL
Status: DISCONTINUED | OUTPATIENT
Start: 2024-06-09 | End: 2024-06-10 | Stop reason: HOSPADM

## 2024-06-09 RX ORDER — OXYTOCIN/0.9 % SODIUM CHLORIDE 30/500 ML
100-340 PLASTIC BAG, INJECTION (ML) INTRAVENOUS CONTINUOUS PRN
Status: DISCONTINUED | OUTPATIENT
Start: 2024-06-09 | End: 2024-06-13 | Stop reason: HOSPADM

## 2024-06-09 RX ORDER — LIDOCAINE 40 MG/G
CREAM TOPICAL
Status: DISCONTINUED | OUTPATIENT
Start: 2024-06-09 | End: 2024-06-10 | Stop reason: HOSPADM

## 2024-06-09 RX ORDER — PENICILLIN G POTASSIUM 5000000 [IU]/1
5 INJECTION, POWDER, FOR SOLUTION INTRAMUSCULAR; INTRAVENOUS ONCE
Status: COMPLETED | OUTPATIENT
Start: 2024-06-09 | End: 2024-06-09

## 2024-06-09 RX ORDER — OXYTOCIN/0.9 % SODIUM CHLORIDE 30/500 ML
340 PLASTIC BAG, INJECTION (ML) INTRAVENOUS CONTINUOUS PRN
Status: DISCONTINUED | OUTPATIENT
Start: 2024-06-09 | End: 2024-06-10 | Stop reason: HOSPADM

## 2024-06-09 RX ORDER — NALOXONE HYDROCHLORIDE 0.4 MG/ML
0.2 INJECTION, SOLUTION INTRAMUSCULAR; INTRAVENOUS; SUBCUTANEOUS
Status: DISCONTINUED | OUTPATIENT
Start: 2024-06-09 | End: 2024-06-10 | Stop reason: HOSPADM

## 2024-06-09 RX ORDER — PROCHLORPERAZINE MALEATE 10 MG
10 TABLET ORAL EVERY 6 HOURS PRN
Status: DISCONTINUED | OUTPATIENT
Start: 2024-06-09 | End: 2024-06-10 | Stop reason: HOSPADM

## 2024-06-09 RX ORDER — FENTANYL CITRATE 50 UG/ML
100 INJECTION, SOLUTION INTRAMUSCULAR; INTRAVENOUS
Status: DISCONTINUED | OUTPATIENT
Start: 2024-06-09 | End: 2024-06-10 | Stop reason: HOSPADM

## 2024-06-09 RX ORDER — OXYTOCIN 10 [USP'U]/ML
10 INJECTION, SOLUTION INTRAMUSCULAR; INTRAVENOUS
Status: DISCONTINUED | OUTPATIENT
Start: 2024-06-09 | End: 2024-06-13 | Stop reason: HOSPADM

## 2024-06-09 RX ORDER — TRANEXAMIC ACID 10 MG/ML
1 INJECTION, SOLUTION INTRAVENOUS EVERY 30 MIN PRN
Status: DISCONTINUED | OUTPATIENT
Start: 2024-06-09 | End: 2024-06-10 | Stop reason: HOSPADM

## 2024-06-09 RX ORDER — METOCLOPRAMIDE HYDROCHLORIDE 5 MG/ML
10 INJECTION INTRAMUSCULAR; INTRAVENOUS EVERY 6 HOURS PRN
Status: DISCONTINUED | OUTPATIENT
Start: 2024-06-09 | End: 2024-06-10 | Stop reason: HOSPADM

## 2024-06-09 RX ORDER — NALOXONE HYDROCHLORIDE 0.4 MG/ML
0.4 INJECTION, SOLUTION INTRAMUSCULAR; INTRAVENOUS; SUBCUTANEOUS
Status: DISCONTINUED | OUTPATIENT
Start: 2024-06-09 | End: 2024-06-10 | Stop reason: HOSPADM

## 2024-06-09 RX ORDER — MISOPROSTOL 200 UG/1
400 TABLET ORAL
Status: DISCONTINUED | OUTPATIENT
Start: 2024-06-09 | End: 2024-06-10 | Stop reason: HOSPADM

## 2024-06-09 RX ORDER — KETOROLAC TROMETHAMINE 30 MG/ML
30 INJECTION, SOLUTION INTRAMUSCULAR; INTRAVENOUS
Status: DISCONTINUED | OUTPATIENT
Start: 2024-06-09 | End: 2024-06-13 | Stop reason: HOSPADM

## 2024-06-09 RX ORDER — METHYLERGONOVINE MALEATE 0.2 MG/ML
200 INJECTION INTRAVENOUS
Status: DISCONTINUED | OUTPATIENT
Start: 2024-06-09 | End: 2024-06-10 | Stop reason: HOSPADM

## 2024-06-09 RX ORDER — LOPERAMIDE HCL 2 MG
4 CAPSULE ORAL
Status: DISCONTINUED | OUTPATIENT
Start: 2024-06-09 | End: 2024-06-10 | Stop reason: HOSPADM

## 2024-06-09 RX ORDER — ACETAMINOPHEN 325 MG/1
650 TABLET ORAL EVERY 4 HOURS PRN
Status: DISCONTINUED | OUTPATIENT
Start: 2024-06-09 | End: 2024-06-10 | Stop reason: HOSPADM

## 2024-06-09 RX ORDER — PROCHLORPERAZINE 25 MG
25 SUPPOSITORY, RECTAL RECTAL EVERY 12 HOURS PRN
Status: DISCONTINUED | OUTPATIENT
Start: 2024-06-09 | End: 2024-06-10 | Stop reason: HOSPADM

## 2024-06-09 RX ORDER — ONDANSETRON 2 MG/ML
4 INJECTION INTRAMUSCULAR; INTRAVENOUS EVERY 6 HOURS PRN
Status: DISCONTINUED | OUTPATIENT
Start: 2024-06-09 | End: 2024-06-10 | Stop reason: HOSPADM

## 2024-06-09 RX ORDER — OXYTOCIN 10 [USP'U]/ML
10 INJECTION, SOLUTION INTRAMUSCULAR; INTRAVENOUS
Status: DISCONTINUED | OUTPATIENT
Start: 2024-06-09 | End: 2024-06-10 | Stop reason: HOSPADM

## 2024-06-09 RX ORDER — LIDOCAINE 40 MG/G
CREAM TOPICAL
Status: DISCONTINUED | OUTPATIENT
Start: 2024-06-09 | End: 2024-06-09 | Stop reason: HOSPADM

## 2024-06-09 RX ORDER — PENICILLIN G 3000000 [IU]/50ML
3 INJECTION, SOLUTION INTRAVENOUS EVERY 4 HOURS
Status: DISCONTINUED | OUTPATIENT
Start: 2024-06-10 | End: 2024-06-10 | Stop reason: HOSPADM

## 2024-06-09 RX ORDER — LOPERAMIDE HCL 2 MG
2 CAPSULE ORAL
Status: DISCONTINUED | OUTPATIENT
Start: 2024-06-09 | End: 2024-06-10 | Stop reason: HOSPADM

## 2024-06-09 RX ORDER — METOCLOPRAMIDE 10 MG/1
10 TABLET ORAL EVERY 6 HOURS PRN
Status: DISCONTINUED | OUTPATIENT
Start: 2024-06-09 | End: 2024-06-10 | Stop reason: HOSPADM

## 2024-06-09 RX ORDER — SODIUM CHLORIDE, SODIUM LACTATE, POTASSIUM CHLORIDE, CALCIUM CHLORIDE 600; 310; 30; 20 MG/100ML; MG/100ML; MG/100ML; MG/100ML
INJECTION, SOLUTION INTRAVENOUS CONTINUOUS
Status: DISCONTINUED | OUTPATIENT
Start: 2024-06-09 | End: 2024-06-10 | Stop reason: HOSPADM

## 2024-06-09 RX ORDER — CITRIC ACID/SODIUM CITRATE 334-500MG
30 SOLUTION, ORAL ORAL
Status: DISCONTINUED | OUTPATIENT
Start: 2024-06-09 | End: 2024-06-10 | Stop reason: HOSPADM

## 2024-06-09 RX ORDER — ONDANSETRON 4 MG/1
4 TABLET, ORALLY DISINTEGRATING ORAL EVERY 6 HOURS PRN
Status: DISCONTINUED | OUTPATIENT
Start: 2024-06-09 | End: 2024-06-10 | Stop reason: HOSPADM

## 2024-06-09 RX ORDER — IBUPROFEN 800 MG/1
800 TABLET, FILM COATED ORAL
Status: DISCONTINUED | OUTPATIENT
Start: 2024-06-09 | End: 2024-06-13 | Stop reason: HOSPADM

## 2024-06-09 RX ORDER — CARBOPROST TROMETHAMINE 250 UG/ML
250 INJECTION, SOLUTION INTRAMUSCULAR
Status: DISCONTINUED | OUTPATIENT
Start: 2024-06-09 | End: 2024-06-10 | Stop reason: HOSPADM

## 2024-06-09 RX ADMIN — PENICILLIN G POTASSIUM 5 MILLION UNITS: 5000000 POWDER, FOR SOLUTION INTRAMUSCULAR; INTRAPLEURAL; INTRATHECAL; INTRAVENOUS at 20:39

## 2024-06-09 ASSESSMENT — ACTIVITIES OF DAILY LIVING (ADL)
ADLS_ACUITY_SCORE: 18
ADLS_ACUITY_SCORE: 35
ADLS_ACUITY_SCORE: 18

## 2024-06-10 ENCOUNTER — ANESTHESIA EVENT (OUTPATIENT)
Dept: OBGYN | Facility: HOSPITAL | Age: 29
End: 2024-06-10
Payer: COMMERCIAL

## 2024-06-10 ENCOUNTER — ANESTHESIA (OUTPATIENT)
Dept: OBGYN | Facility: HOSPITAL | Age: 29
End: 2024-06-10
Payer: COMMERCIAL

## 2024-06-10 LAB
GP B STREP DNA SPEC QL NAA+PROBE: NEGATIVE
T PALLIDUM AB SER QL: NONREACTIVE

## 2024-06-10 PROCEDURE — 120N000001 HC R&B MED SURG/OB

## 2024-06-10 PROCEDURE — 250N000011 HC RX IP 250 OP 636: Performed by: ANESTHESIOLOGY

## 2024-06-10 PROCEDURE — 3E0R3BZ INTRODUCTION OF ANESTHETIC AGENT INTO SPINAL CANAL, PERCUTANEOUS APPROACH: ICD-10-PCS | Performed by: ANESTHESIOLOGY

## 2024-06-10 PROCEDURE — 00HU33Z INSERTION OF INFUSION DEVICE INTO SPINAL CANAL, PERCUTANEOUS APPROACH: ICD-10-PCS | Performed by: ANESTHESIOLOGY

## 2024-06-10 PROCEDURE — 250N000009 HC RX 250: Performed by: ANESTHESIOLOGY

## 2024-06-10 PROCEDURE — 250N000009 HC RX 250: Performed by: OBSTETRICS & GYNECOLOGY

## 2024-06-10 PROCEDURE — 258N000003 HC RX IP 258 OP 636: Mod: JZ | Performed by: OBSTETRICS & GYNECOLOGY

## 2024-06-10 PROCEDURE — 722N000001 HC LABOR CARE VAGINAL DELIVERY SINGLE

## 2024-06-10 PROCEDURE — 370N000003 HC ANESTHESIA WARD SERVICE: Performed by: ANESTHESIOLOGY

## 2024-06-10 PROCEDURE — 250N000011 HC RX IP 250 OP 636: Mod: JZ | Performed by: OBSTETRICS & GYNECOLOGY

## 2024-06-10 PROCEDURE — 250N000013 HC RX MED GY IP 250 OP 250 PS 637: Performed by: OBSTETRICS & GYNECOLOGY

## 2024-06-10 RX ORDER — LEVOTHYROXINE SODIUM 25 UG/1
100 TABLET ORAL
Status: DISCONTINUED | OUTPATIENT
Start: 2024-06-10 | End: 2024-06-13 | Stop reason: HOSPADM

## 2024-06-10 RX ORDER — NALBUPHINE HYDROCHLORIDE 20 MG/ML
2.5-5 INJECTION, SOLUTION INTRAMUSCULAR; INTRAVENOUS; SUBCUTANEOUS EVERY 6 HOURS PRN
Status: DISCONTINUED | OUTPATIENT
Start: 2024-06-10 | End: 2024-06-13 | Stop reason: HOSPADM

## 2024-06-10 RX ORDER — OXYTOCIN 10 [USP'U]/ML
10 INJECTION, SOLUTION INTRAMUSCULAR; INTRAVENOUS
Status: DISCONTINUED | OUTPATIENT
Start: 2024-06-10 | End: 2024-06-13 | Stop reason: HOSPADM

## 2024-06-10 RX ORDER — OXYTOCIN/0.9 % SODIUM CHLORIDE 30/500 ML
1-24 PLASTIC BAG, INJECTION (ML) INTRAVENOUS CONTINUOUS
Status: DISCONTINUED | OUTPATIENT
Start: 2024-06-10 | End: 2024-06-10 | Stop reason: HOSPADM

## 2024-06-10 RX ORDER — CARBOPROST TROMETHAMINE 250 UG/ML
250 INJECTION, SOLUTION INTRAMUSCULAR
Status: DISCONTINUED | OUTPATIENT
Start: 2024-06-10 | End: 2024-06-13 | Stop reason: HOSPADM

## 2024-06-10 RX ORDER — FENTANYL CITRATE-0.9 % NACL/PF 10 MCG/ML
100 PLASTIC BAG, INJECTION (ML) INTRAVENOUS EVERY 5 MIN PRN
Status: DISCONTINUED | OUTPATIENT
Start: 2024-06-10 | End: 2024-06-10 | Stop reason: HOSPADM

## 2024-06-10 RX ORDER — LOPERAMIDE HCL 2 MG
4 CAPSULE ORAL
Status: DISCONTINUED | OUTPATIENT
Start: 2024-06-10 | End: 2024-06-13 | Stop reason: HOSPADM

## 2024-06-10 RX ORDER — DOCUSATE SODIUM 100 MG/1
100 CAPSULE, LIQUID FILLED ORAL DAILY
Status: DISCONTINUED | OUTPATIENT
Start: 2024-06-10 | End: 2024-06-13 | Stop reason: HOSPADM

## 2024-06-10 RX ORDER — LIDOCAINE HYDROCHLORIDE AND EPINEPHRINE 15; 5 MG/ML; UG/ML
INJECTION, SOLUTION EPIDURAL PRN
Status: DISCONTINUED | OUTPATIENT
Start: 2024-06-10 | End: 2024-06-10

## 2024-06-10 RX ORDER — HYDROCORTISONE 25 MG/G
CREAM TOPICAL 3 TIMES DAILY PRN
Status: DISCONTINUED | OUTPATIENT
Start: 2024-06-10 | End: 2024-06-13 | Stop reason: HOSPADM

## 2024-06-10 RX ORDER — MISOPROSTOL 200 UG/1
400 TABLET ORAL
Status: DISCONTINUED | OUTPATIENT
Start: 2024-06-10 | End: 2024-06-13 | Stop reason: HOSPADM

## 2024-06-10 RX ORDER — ONDANSETRON 2 MG/ML
4 INJECTION INTRAMUSCULAR; INTRAVENOUS EVERY 6 HOURS PRN
Status: DISCONTINUED | OUTPATIENT
Start: 2024-06-10 | End: 2024-06-10 | Stop reason: HOSPADM

## 2024-06-10 RX ORDER — ACETAMINOPHEN 325 MG/1
650 TABLET ORAL EVERY 4 HOURS PRN
Status: DISCONTINUED | OUTPATIENT
Start: 2024-06-10 | End: 2024-06-13 | Stop reason: HOSPADM

## 2024-06-10 RX ORDER — OXYTOCIN/0.9 % SODIUM CHLORIDE 30/500 ML
340 PLASTIC BAG, INJECTION (ML) INTRAVENOUS CONTINUOUS PRN
Status: DISCONTINUED | OUTPATIENT
Start: 2024-06-10 | End: 2024-06-13 | Stop reason: HOSPADM

## 2024-06-10 RX ORDER — FENTANYL/ROPIVACAINE/NS/PF 2MCG/ML-.1
PLASTIC BAG, INJECTION (ML) EPIDURAL
Status: DISCONTINUED | OUTPATIENT
Start: 2024-06-10 | End: 2024-06-10 | Stop reason: HOSPADM

## 2024-06-10 RX ORDER — LOPERAMIDE HCL 2 MG
2 CAPSULE ORAL
Status: DISCONTINUED | OUTPATIENT
Start: 2024-06-10 | End: 2024-06-13 | Stop reason: HOSPADM

## 2024-06-10 RX ORDER — METHYLERGONOVINE MALEATE 0.2 MG/ML
200 INJECTION INTRAVENOUS
Status: DISCONTINUED | OUTPATIENT
Start: 2024-06-10 | End: 2024-06-13 | Stop reason: HOSPADM

## 2024-06-10 RX ORDER — LIDOCAINE HYDROCHLORIDE 20 MG/ML
INJECTION, SOLUTION INFILTRATION; PERINEURAL PRN
Status: DISCONTINUED | OUTPATIENT
Start: 2024-06-10 | End: 2024-06-10

## 2024-06-10 RX ORDER — LIDOCAINE 40 MG/G
CREAM TOPICAL
Status: DISCONTINUED | OUTPATIENT
Start: 2024-06-10 | End: 2024-06-10 | Stop reason: HOSPADM

## 2024-06-10 RX ORDER — BISACODYL 10 MG
10 SUPPOSITORY, RECTAL RECTAL DAILY PRN
Status: DISCONTINUED | OUTPATIENT
Start: 2024-06-10 | End: 2024-06-13 | Stop reason: HOSPADM

## 2024-06-10 RX ORDER — TERBUTALINE SULFATE 1 MG/ML
0.25 INJECTION, SOLUTION SUBCUTANEOUS
Status: DISCONTINUED | OUTPATIENT
Start: 2024-06-10 | End: 2024-06-10 | Stop reason: HOSPADM

## 2024-06-10 RX ORDER — TRANEXAMIC ACID 10 MG/ML
1 INJECTION, SOLUTION INTRAVENOUS EVERY 30 MIN PRN
Status: DISCONTINUED | OUTPATIENT
Start: 2024-06-10 | End: 2024-06-13 | Stop reason: HOSPADM

## 2024-06-10 RX ORDER — SODIUM CHLORIDE, SODIUM LACTATE, POTASSIUM CHLORIDE, CALCIUM CHLORIDE 600; 310; 30; 20 MG/100ML; MG/100ML; MG/100ML; MG/100ML
INJECTION, SOLUTION INTRAVENOUS CONTINUOUS PRN
Status: DISCONTINUED | OUTPATIENT
Start: 2024-06-10 | End: 2024-06-10 | Stop reason: HOSPADM

## 2024-06-10 RX ORDER — ONDANSETRON 4 MG/1
4 TABLET, ORALLY DISINTEGRATING ORAL EVERY 6 HOURS PRN
Status: DISCONTINUED | OUTPATIENT
Start: 2024-06-10 | End: 2024-06-10 | Stop reason: HOSPADM

## 2024-06-10 RX ORDER — MISOPROSTOL 200 UG/1
800 TABLET ORAL
Status: DISCONTINUED | OUTPATIENT
Start: 2024-06-10 | End: 2024-06-13 | Stop reason: HOSPADM

## 2024-06-10 RX ORDER — IBUPROFEN 800 MG/1
800 TABLET, FILM COATED ORAL EVERY 6 HOURS PRN
Status: DISCONTINUED | OUTPATIENT
Start: 2024-06-10 | End: 2024-06-13 | Stop reason: HOSPADM

## 2024-06-10 RX ORDER — MODIFIED LANOLIN
OINTMENT (GRAM) TOPICAL
Status: DISCONTINUED | OUTPATIENT
Start: 2024-06-10 | End: 2024-06-13 | Stop reason: HOSPADM

## 2024-06-10 RX ADMIN — PENICILLIN G 3 MILLION UNITS: 3000000 INJECTION, SOLUTION INTRAVENOUS at 00:34

## 2024-06-10 RX ADMIN — PENICILLIN G 3 MILLION UNITS: 3000000 INJECTION, SOLUTION INTRAVENOUS at 12:37

## 2024-06-10 RX ADMIN — LIDOCAINE HYDROCHLORIDE 4 ML: 20 INJECTION, SOLUTION INFILTRATION; PERINEURAL at 11:44

## 2024-06-10 RX ADMIN — PENICILLIN G 3 MILLION UNITS: 3000000 INJECTION, SOLUTION INTRAVENOUS at 08:39

## 2024-06-10 RX ADMIN — SODIUM CHLORIDE, POTASSIUM CHLORIDE, SODIUM LACTATE AND CALCIUM CHLORIDE: 600; 310; 30; 20 INJECTION, SOLUTION INTRAVENOUS at 11:03

## 2024-06-10 RX ADMIN — LEVOTHYROXINE SODIUM 100 MCG: 0.03 TABLET ORAL at 05:20

## 2024-06-10 RX ADMIN — PENICILLIN G 3 MILLION UNITS: 3000000 INJECTION, SOLUTION INTRAVENOUS at 17:16

## 2024-06-10 RX ADMIN — SODIUM CHLORIDE, POTASSIUM CHLORIDE, SODIUM LACTATE AND CALCIUM CHLORIDE: 600; 310; 30; 20 INJECTION, SOLUTION INTRAVENOUS at 15:11

## 2024-06-10 RX ADMIN — ACETAMINOPHEN 650 MG: 325 TABLET ORAL at 19:41

## 2024-06-10 RX ADMIN — DOCUSATE SODIUM 100 MG: 100 CAPSULE, LIQUID FILLED ORAL at 19:41

## 2024-06-10 RX ADMIN — PENICILLIN G 3 MILLION UNITS: 3000000 INJECTION, SOLUTION INTRAVENOUS at 04:28

## 2024-06-10 RX ADMIN — Medication 2 MILLI-UNITS/MIN: at 05:01

## 2024-06-10 RX ADMIN — LIDOCAINE HYDROCHLORIDE,EPINEPHRINE BITARTRATE 2 ML: 15; .005 INJECTION, SOLUTION EPIDURAL; INFILTRATION; INTRACAUDAL; PERINEURAL at 11:44

## 2024-06-10 RX ADMIN — WITCH HAZEL: 500 SOLUTION RECTAL; TOPICAL at 20:39

## 2024-06-10 RX ADMIN — Medication: at 11:36

## 2024-06-10 RX ADMIN — LIDOCAINE HYDROCHLORIDE,EPINEPHRINE BITARTRATE 3 ML: 15; .005 INJECTION, SOLUTION EPIDURAL; INFILTRATION; INTRACAUDAL; PERINEURAL at 11:40

## 2024-06-10 ASSESSMENT — ACTIVITIES OF DAILY LIVING (ADL)
ADLS_ACUITY_SCORE: 18
ADLS_ACUITY_SCORE: 21
ADLS_ACUITY_SCORE: 18
ADLS_ACUITY_SCORE: 18
ADLS_ACUITY_SCORE: 21
ADLS_ACUITY_SCORE: 18

## 2024-06-10 NOTE — PROGRESS NOTES
"[facility] Labor and Delivery Progress Note    Zainab Mar MRN# [id number]   Age: 28 year old YOB: 1995           Subjective:   Zainab is here with PPROM at 36w2d. She is getting more uncomfortable. She would like an epidural.            Objective:   Patient Vitals for the past 24 hrs:   BP Temp Temp src Pulse Resp SpO2 Height Weight BMI (Calculated) Oximeter Heart Rate   06/10/24 0958 132/85 98  F (36.7  C) Oral -- 18 -- -- -- -- 77 bpm   06/10/24 0909 136/78 98.5  F (36.9  C) Oral -- 18 -- -- -- -- 86 bpm   06/10/24 0755 125/77 97.9  F (36.6  C) Oral -- 18 -- -- -- -- 82 bpm   06/10/24 0707 122/58 98.7  F (37.1  C) Axillary 88 18 -- -- -- -- 88 bpm   06/10/24 0559 113/71 98  F (36.7  C) Oral 76 16 -- -- -- -- 76 bpm   06/10/24 0504 111/61 97.5  F (36.4  C) Oral 79 16 -- -- -- -- 79 bpm   06/10/24 0400 -- 98.5  F (36.9  C) Oral -- -- -- -- -- -- --   06/10/24 0255 130/72 -- -- 87 16 96 % -- -- -- 87 bpm   06/10/24 0230 -- 98.2  F (36.8  C) Oral -- -- -- -- -- -- --   06/10/24 0130 -- 98.5  F (36.9  C) Oral -- -- -- -- -- -- --   06/10/24 0030 -- 97.7  F (36.5  C) Oral -- -- -- -- -- -- --   24 2300 128/68 98.3  F (36.8  C) Oral 80 16 -- -- -- -- 80 bpm   24 2158 -- 98.3  F (36.8  C) Oral -- -- -- -- -- -- --   24 127/78 -- -- -- -- -- -- -- -- 90 bpm   24 -- -- -- -- -- -- 1.575 m (5' 2\") 91.2 kg (201 lb) 36.76 --   24 1855 119/74 98.1  F (36.7  C) Oral -- 16 98 % -- -- -- 93 bpm     Bedside ultrasound confirms vertex       Cervical Exam: 3  90% / -1      Position: Posterior, forebag noted    Membranes: Ruptured      Fetal Heart Rate:    Monitor: tiffany    Variability: moderate (amplitude range 6 to 25 bpm)    Baseline Rate: normal range    Fetal Heart Rate Tracins          Assessment:   Zainab Mar is a 28 year old  who is 36w2d here with PPROM, labor, GBS+          Plan:   Continue Pitocin, currently at 10 mU  Epidural now  Discussed AROM " of forebag after epidural       Agueda Montenegro MD

## 2024-06-10 NOTE — ANESTHESIA PREPROCEDURE EVALUATION
Anesthesia Pre-Procedure Evaluation    Patient: Zainab Mar   MRN: [id number] : 1995        Procedure : LE          Past Medical History:   Diagnosis Date    Anxiety and depression     Depressive disorder     Hypothyroidism 2023    PCOS (polycystic ovarian syndrome) 2023      Past Surgical History:   Procedure Laterality Date    ORTHOPEDIC SURGERY      right thumb    TONSILLECTOMY Bilateral 2020      No Known Allergies   Social History     Tobacco Use    Smoking status: Former     Current packs/day: 0.00     Types: Cigarettes     Quit date: 9/10/2017     Years since quittin.7     Passive exposure: Never    Smokeless tobacco: Never   Substance Use Topics    Alcohol use: Not Currently     Comment: socially      Wt Readings from Last 1 Encounters:   24 91.2 kg (201 lb)        Anesthesia Evaluation        No history of anesthetic complications       ROS/MED HX  ENT/Pulmonary:  - neg pulmonary ROS   (+)     WILIAN risk factors,                                   Neurologic:  - neg neurologic ROS     Cardiovascular:  - neg cardiovascular ROS     METS/Exercise Tolerance:     Hematologic:     (+)      anemia,          Musculoskeletal:       GI/Hepatic:     (+)                cholestasis    Renal/Genitourinary:       Endo: Comment: PCOS.    (+)          thyroid problem,     Obesity,       Psychiatric/Substance Use:     (+) psychiatric history anxiety and depression       Infectious Disease:       Malignancy:       Other:     (-) previous  and TOLAC candidate       Physical Exam    Airway        Mallampati: II    Neck ROM: full     Respiratory Devices and Support         Dental           Cardiovascular   cardiovascular exam normal          Pulmonary   pulmonary exam normal                OUTSIDE LABS:  CBC:   Lab Results   Component Value Date    WBC 12.5 (H) 2024    WBC 13.2 (H) 2024    HGB 12.8 2024    HGB 13.1 2024    HCT 36.8 2024    HCT 37.8  "05/29/2024     06/09/2024     05/29/2024     BMP:   Lab Results   Component Value Date     (L) 06/09/2024     05/29/2024    POTASSIUM 3.9 06/09/2024    POTASSIUM 4.0 05/29/2024    CHLORIDE 100 06/09/2024    CHLORIDE 104 05/29/2024    CO2 21 (L) 06/09/2024    CO2 16 (L) 05/29/2024    BUN 11.2 06/09/2024    BUN 7.1 05/29/2024    CR 0.66 06/09/2024    CR 0.53 05/29/2024    GLC 98 06/09/2024     (H) 05/29/2024     COAGS: No results found for: \"PTT\", \"INR\", \"FIBR\"  POC:   Lab Results   Component Value Date    HCG Negative 04/25/2023     HEPATIC:   Lab Results   Component Value Date    ALBUMIN 3.4 (L) 06/09/2024    PROTTOTAL 6.0 (L) 06/09/2024    ALT 13 06/09/2024    AST 19 06/09/2024    ALKPHOS 133 06/09/2024    BILITOTAL 0.2 06/09/2024     OTHER:   Lab Results   Component Value Date    A1C 5.0 04/25/2023    BRODY 9.4 06/09/2024    TSH 1.27 06/09/2024    T4 0.87 (L) 01/10/2024       Anesthesia Plan    ASA Status:  3       Anesthesia Type: Epidural.              Consents    Anesthesia Plan(s) and associated risks, benefits, and realistic alternatives discussed. Questions answered and patient/representative(s) expressed understanding.     - Discussed:     - Discussed with:  Patient            Postoperative Care            Comments:    Other Comments: LE       neg OB ROS.      Buster Loya MD    I have reviewed the pertinent notes and labs in the chart from the past 30 days and (re)examined the patient.  Any updates or changes from those notes are reflected in this note.     # Hyponatremia: Lowest Na = 134 mmol/L in last 30 days, will monitor as appropriate      # Hypoalbuminemia: Lowest albumin = 3.4 g/dL at 6/9/2024  7:48 PM, will monitor as appropriate         "

## 2024-06-10 NOTE — PLAN OF CARE
Problem: Labor  Goal: Effective Progression to Delivery  Outcome: Progressing     Patient continued to contracted occasionally throughout the night but is not uncomfortable. Patient agreeable to Pitocin. RN will continue to provide labor support as needed. Anticipate .

## 2024-06-10 NOTE — ANESTHESIA PROCEDURE NOTES
"Epidural catheter Procedure Note    Pre-Procedure   Staff -        Anesthesiologist:  Buster Loya MD       Performed By: anesthesiologist       Location: OB       Procedure Start/Stop Times: 6/10/2024 11:28 AM and 6/10/2024 11:44 AM       Pre-Anesthestic Checklist: patient identified, IV checked, risks and benefits discussed, informed consent, monitors and equipment checked and pre-op evaluation  Timeout:       Correct Patient: Yes        Correct Procedure: Yes        Correct Site: Yes        Correct Position: Yes   Procedure Documentation  Procedure: epidural catheter       Patient Position: sitting       Patient Prep/Sterile Barriers: sterile gloves, mask, patient draped       Skin prep: Chloraprep       Local skin infiltrated with mL of 1% lidocaine.        Insertion Site: L2-3. (midline approach).       Technique: LORT saline        BARBARA at 7 cm.       Needle Type: [employer]       Needle Gauge: 18.        Needle Length (Inches): 3.5        Catheter: 20 G.          Catheter threaded easily.         7 cm epidural space.         Threaded 14 cm at skin.         # of attempts: 1 and  # of redirects:     Assessment/Narrative         Paresthesias: No.       Test dose of mL lidocaine 1.5% w/ 1:200,000 epinephrine at.         Test dose negative, 3 minutes after injection, for signs of intravascular, subdural, or intrathecal injection.       Insertion/Infusion Method: LORT saline       Aspiration negative for Heme or CSF via Epidural Catheter.    Medication(s) Administered   Medication Administration Time: 6/10/2024 11:28 AM      FOR [facility] ([facility]) ONLY:   Pain Team Contact information: please page the Pain Team Via [employer]. Search \"Pain\". During daytime hours, please page the attending first. At night please page the resident first.      "

## 2024-06-10 NOTE — PROGRESS NOTES
Dr Garcia given report/update via phone.   Update on pt medical and OB history, current VS, FHR cat 1, contraction pattern, current pitocin rate, edema, labs, and pain management plan.  RN continuing to provide care and support at bedside. Dr Garcia request ultrasound to verify vertex by Dr Sherman in department.   Plan to continue progression towards .

## 2024-06-10 NOTE — PROGRESS NOTES
RN called to update Dr Garcia on pt status including ultrasound and last cervical exam per Dr Montenegro and Dr Sherman 3/90/-1, Epidural placement, gush of clear at 1200, Fhr strip including early decels, contraction pattern, and pitocin rate. Order received for indwelling alicea placement. Pt reports feeling increased vaginal pressure but able to rest comfortably between contractions with epidural. VSS and afebrile.

## 2024-06-10 NOTE — H&P
[facility]    History and Physical  Obstetrics and Gynecology     Date of Admission:  2024    Assessment & Plan   Zainab Mar is a 28 year old  at 36w1d who is admitted for PPROM at 5 PM today.      PLAN:   Admit - see IP orders  Labor induction with Pitocin if needed.  Prophylactic antibiotic for + GBS status  Hypothyroidism - on levothyroxine. Home dose ordered and updated TSH obtained as could be cause of edema.  Edema - normal blood pressures and pre-eclampsia labs. SCDs.   Anticipate     Carrie Romero MD, FACOG, [facility]  2024 8:17 PM          History of Present Illness   Zainab Mar is a 28 year old female  at 36w1d is [facility] for PPROM to clear fluid today at 5 PM. No contractions before ROM but did lose her mucus plug this morning. Was out on a boat earlier today, wonders if the movement of the boat caused ROM. She has been being monitored for her edema with normal pre-eclampsia labs.     PRENATAL COURSE  Prenatal course was complicated by   -Hypothyroidism - on levothyroxine  -Excessive weight gain in pregnancy (60 lbs)  -COVID-19 in 1st trimester    Prenatal labs notable for:GBS unknown, Rh positive      Past Medical History    I have reviewed this patient's medical history and updated it with pertinent information if needed.   Past Medical History:   Diagnosis Date    Anxiety and depression     Depressive disorder     Hypothyroidism 2023    PCOS (polycystic ovarian syndrome) 2023       Past Surgical History   I have reviewed this patient's surgical history and updated it with pertinent information if needed.  Past Surgical History:   Procedure Laterality Date    ORTHOPEDIC SURGERY      right thumb    TONSILLECTOMY Bilateral 2020       Prior to Admission Medications   Prior to Admission Medications   Prescriptions Last Dose Informant Patient Reported? Taking?   Prenatal Vit-Fe Fumarate-FA (PRENATAL MULTIVITAMIN  PLUS  IRON) 27-1 MG TABS 6/9/2024  Yes Yes   Sig: Take 1 tablet by mouth daily   levothyroxine (SYNTHROID/LEVOTHROID) 100 MCG tablet 6/9/2024  No Yes   Sig: Take 1 tablet (100 mcg) by mouth daily      Facility-Administered Medications: None     Allergies   No Known Allergies    Social History   I have reviewed this patient's social history and updated it with pertinent information if needed. Zainab Mar  reports that she quit smoking about 6 years ago. Her smoking use included cigarettes. She has never been exposed to tobacco smoke. She has never used smokeless tobacco. She reports that she does not currently use alcohol. She reports that she does not use drugs.    Immunization History   TDAP given this pregnancy.  Influenza vaccine not given this pregnancy.    Physical Exam   Temp: 98.1  F (36.7  C) Temp src: Oral BP: 127/78     Resp: 16 SpO2: 98 % O2 Device: None (Room air)    Vital Signs with Ranges  Temp:  [98.1  F (36.7  C)] 98.1  F (36.7  C)  Resp:  [16] 16  BP: (119-127)/(74-78) 127/78  SpO2:  [98 %] 98 %    Abdomen: gravid, single vertex fetus, non-tender, EFW 6 lbs   Cervical Exam: 1.5/80/-1    Fetal Heart Tones: Category 1, reactive   TOCO:   External monitor     Constitutional: healthy, alert  Respiratory: No increased work of breathing, good air exchange.  Cardiovascular: RRR  2-3+ edema

## 2024-06-10 NOTE — PROGRESS NOTES
Data: Patient presented to [facility]: 2024  6:43 PM.  Reason for maternal/fetal assessment is uterine contractions and leaking of vaginal fluid since 1745. Patient reports feeling leaking vaginally at 1745 once, and again 15 minutes later, and contractions had began around 1820. Patient denies vaginal bleeding, abdominal pain, pelvic pressure, nausea, vomiting. Patient reports fetal movement is decreased.  Patient reports a HA 4-5/10 for the past couple of weeks, blurred vision off and on the past couple of weeks, and notes intrascapular pain 4-5/10 that has been consistent over the past couple of weeks.  Reflexes normal, two beats of clonus.  RN noted significant bilateral lower extremity swelling. Patient had initially looked very uncomfortable when she presented to triage, and so RN completed SVE: 1.5/80%/-1/soft/mid, cephalic presentation.  Patient is a 36w1d .  Prenatal record reviewed. Pregnancy has been complicated by hypothyroidism, PCOS, BLE swelling the past couple weeks .    Vital signs wnl. Support person is present.     Action: Verbal consent for EFM.  NST is reactive. Triage assessment completed.     Response: Patient verbalized agreement with plan. Will contact Dr. Romero with update and further orders.     RN relayed the above information to MD.  Per MD - obtain ROM+, wet prep, GBS, and pre-ecclampsia labs.

## 2024-06-11 PROCEDURE — 250N000013 HC RX MED GY IP 250 OP 250 PS 637: Performed by: OBSTETRICS & GYNECOLOGY

## 2024-06-11 PROCEDURE — 120N000001 HC R&B MED SURG/OB

## 2024-06-11 RX ADMIN — LEVOTHYROXINE SODIUM 100 MCG: 0.03 TABLET ORAL at 05:09

## 2024-06-11 RX ADMIN — ACETAMINOPHEN 650 MG: 325 TABLET ORAL at 03:45

## 2024-06-11 RX ADMIN — IBUPROFEN 800 MG: 800 TABLET ORAL at 20:39

## 2024-06-11 RX ADMIN — ACETAMINOPHEN 650 MG: 325 TABLET ORAL at 08:45

## 2024-06-11 RX ADMIN — ACETAMINOPHEN 650 MG: 325 TABLET ORAL at 18:16

## 2024-06-11 RX ADMIN — ACETAMINOPHEN 650 MG: 325 TABLET ORAL at 13:55

## 2024-06-11 RX ADMIN — Medication: at 04:02

## 2024-06-11 RX ADMIN — DOCUSATE SODIUM 100 MG: 100 CAPSULE, LIQUID FILLED ORAL at 08:45

## 2024-06-11 ASSESSMENT — ACTIVITIES OF DAILY LIVING (ADL)
ADLS_ACUITY_SCORE: 19
ADLS_ACUITY_SCORE: 18
ADLS_ACUITY_SCORE: 19
ADLS_ACUITY_SCORE: 19
ADLS_ACUITY_SCORE: 18
ADLS_ACUITY_SCORE: 19

## 2024-06-11 NOTE — ANESTHESIA POSTPROCEDURE EVALUATION
Patient: Zainab Mar    Procedure: * No procedures listed *       Anesthesia Type:  No value filed.    Note:  Disposition: Inpatient   Postop Pain Control: Uneventful            Sign Out: Well controlled pain   PONV: No   Neuro/Psych: Uneventful            Sign Out: Acceptable/Baseline neuro status   Airway/Respiratory: Uneventful            Sign Out: Acceptable/Baseline resp. status   CV/Hemodynamics: Uneventful            Sign Out: Acceptable CV status; No obvious hypovolemia; No obvious fluid overload   Other NRE:    DID A NON-ROUTINE EVENT OCCUR?     Event details/Postop Comments:  No headache. No back pain. The effects of the epidural have worn off. No need for follow up.       Last vitals:  Vitals:    06/10/24 2030 06/10/24 2350 06/11/24 0330   BP: 130/72 127/75 123/78   Pulse:  92 96   Resp: 18 18 18   Temp:  37.1  C (98.8  F) 36.6  C (97.8  F)   SpO2: 97% 97% 99%       Electronically Signed By: Sander Cevallos MD  June 11, 2024  6:24 AM

## 2024-06-11 NOTE — PROGRESS NOTES
"OB Post Partum Note    ASSESSMENT: PLAN:     PPD#1   spontaneous vaginal delivery  Doing well  Routine cares  Anticipate discharge to home in am    SUBJECTIVE:   no complaints, denies fever, chills.  Tolerating po, ambulating.  Baby doing well    OBJECTIVE:  /76 (BP Location: Right arm, Patient Position: Semi-Juarez's, Cuff Size: Adult Regular)   Pulse 93   Temp 97.9  F (36.6  C) (Oral)   Resp 18   Ht 1.575 m (5' 2\")   Wt 91.2 kg (201 lb)   LMP 01/10/2023 (Exact Date)   SpO2 99%   Breastfeeding Unknown   BMI 36.76 kg/m        abd- fundus firm  Ext- no tenderness,   cv- regular rate  Lungs- clear    Cornelia Belcher MD  [facility]  [phone]    "

## 2024-06-11 NOTE — PLAN OF CARE
Patient's vitals and maternal assessments WDL. 2 hour post delivery uncomplicated. Total  mLs. Pain adequately managed with Tylenol. Up independently caring for self and infant. Breastfeeding well. Patient is attentive to infant needs, asking appropriate questions and holds infant frequently. Patient voiding adequately.    Problem: Adult Inpatient Plan of Care  Goal: Optimal Comfort and Wellbeing  Outcome: Progressing  Intervention: Provide Person-Centered Care  Recent Flowsheet Documentation  Taken 6/10/2024 1915 by Kinjal Lyons, RN  Trust Relationship/Rapport:   care explained   choices provided   emotional support provided   empathic listening provided   questions answered   questions encouraged   reassurance provided   thoughts/feelings acknowledged     Problem: Postpartum (Vaginal Delivery)  Goal: Successful Parent Role Transition  Outcome: Progressing     Problem: Postpartum (Vaginal Delivery)  Goal: Optimal Pain Control and Function  Outcome: Progressing  Intervention: Prevent or Manage Pain  Recent Flowsheet Documentation  Taken 6/10/2024 2030 by Kinjal Lyons, RN  Perineal Care:   absorbent brief/pad changed   cold pack/ice pack applied   medicated pads applied   perineal hygiene encouraged   perineal spray bottle/warm water use encouraged   perineum cleansed     Problem: Postpartum (Vaginal Delivery)  Goal: Effective Urinary Elimination  Outcome: Progressing     Problem: Breastfeeding  Goal: Effective Breastfeeding  Outcome: Progressing   Goal Outcome Evaluation:      Plan of Care Reviewed With: patient    Overall Patient Progress: improvingOverall Patient Progress: improving

## 2024-06-11 NOTE — ANESTHESIA POSTPROCEDURE EVALUATION
"Patient: Zainab Mar    Procedure: * No procedures listed *       Anesthesia Type:  No value filed.    Note:  Disposition: Inpatient   Postop Pain Control:    PONV: No   Neuro/Psych: Uneventful            Sign Out: Acceptable/Baseline neuro status   Airway/Respiratory: Uneventful            Sign Out: Acceptable/Baseline resp. status   CV/Hemodynamics: Uneventful            Sign Out: Acceptable CV status; No obvious hypovolemia; No obvious fluid overload   Other NRE:    DID A NON-ROUTINE EVENT OCCUR?     Event details/Postop Comments:  Requested re-evaluation secondary to possible PDPH.  On entering the room for evaluation, patient is seated in 60 degree position in bed, smiling with infant in arms.      She reports a headache today, as a tight band around her head \"like a helmet\". Her headache is somewhat positional (improves slightly with laying flat), but does not get worse when she sits upright from an inclined position. Denies n/v, photophobia, dizziness.  She notes that she has had a similar headache for the past few weeks, however her prior headache was unilateral and not improved with supine positioning. Pt endorses daily caffeine use at home, and has not had caffeine for >48hrs at this time.     On discussion of differential diagnosis - possible PDPH, tension headache, caffeine withdrawal - patient is opting for conservative management at this time.  She will orally hydrate and have a cup of coffee at this time. She notes that the headache has not limited her ability to ambulate out of bed or enjoy time with her .  She was informed that an epidural blood patch remains an option to her, especially if the headache worsens or conservative management fails to help it improve. We will plan to follow up with her later this evening to ensure that she is trending towards improvement.            Last vitals:  Vitals:    24 0330 24 0800 24 1205   BP: 123/78 118/76 120/79   Pulse: 96 93 88 "   Resp: 18 18 18   Temp: 36.6  C (97.8  F) 36.6  C (97.9  F) 36.6  C (97.9  F)   SpO2: 99% 99% 100%       Electronically Signed By: Stefanie Doyle MD  June 11, 2024  2:36 PM

## 2024-06-11 NOTE — PLAN OF CARE
Problem: Postpartum (Vaginal Delivery)  Goal: Optimal Pain Control and Function  Outcome: Progressing  Intervention: Prevent or Manage Pain  Recent Flowsheet Documentation  Taken 6/11/2024 0800 by Ashia Steele RN  Perineal Care:   absorbent brief/pad changed   perineal hygiene encouraged   Goal Outcome Evaluation: The client is following a normal involutional process, FF@U1 with scant flow noted, no clots. Voiding without difficulty, no BM as of this time, taking stool softeners. VS and assessments WNL X edema and occasional under right breast pain and HA but client states it is normal from the slipped discs, denies visual changes at this time. Breastfeeding well, lanolin to nipples. Client taking Tylenol for pain in abdomen and neck/shoulder area from slipped disc's. Applied an Aqua K pain to back and client stated pain is being managed by the two interventions. Bonding well with baby girl Chanell, skin-to-skin continuously with either mom or dad. No concerns at this time.

## 2024-06-11 NOTE — L&D DELIVERY NOTE
OB Vaginal Delivery Note    Zainab Mar MRN# [id number]   Age: 28 year old YOB: 1995       GA: 36w2d  GP:   Labor Complications:  PPROM at 36+1 weeks  EBL: 100  mL  Delivery QBL: 120 mL  Delivery Type: Vaginal, Spontaneous   ROM to Delivery Time: (Delivered) Days: 1 Minutes: 41  [place] Weight:     1 Minute 5 Minute 10 Minute   Apgar Totals: 7   9        DONTE BEY;DEMETRIS GALLOWAY;EDD BEDOLLA;ANGELO BLUNT;JUAN ANTONIO CONTRERAS     Delivery Details:  Zainab Mar, a 28 year old  female delivered a viable infant with apgars of 7  and 9 . Delivery was via vaginal, spontaneous  to a sterile field under epidural  anesthesia. Infant delivered in vertex  left  occiput  anterior  position. Anterior and posterior shoulders delivered without difficulty. The cord was clamped, cut twice and 3 vessels  were noted. Cord blood was obtained in routine fashion with the following disposition: discard .      Cord complications: none   Placenta delivered at 6/10/2024  6:29 PM . Placental disposition was Hospital disposal . Fundal massage performed and fundus found to be firm.     Episiotomy: none    Perineum, vagina, cervix were inspected, and the following lacerations were noted:   Perineal lacerations: 1st , not repaired    Excellent hemostasis was noted. Needle count correct. Infant and patient in delivery room in good and stable condition.        Layton Mar-Zainab [[id number]]      Labor Event Times      Latent labor onset date/time: 6/10/2024 1030    Active labor onset date: 6/10/24 Onset time:  2:30 PM   Dilation complete date: 6/10/24 Complete time:  5:38 PM   Start pushing date/time: 6/10/2024 1738          Labor Events     labor?: Yes   steroids: None  Labor Type: Spontaneous  Predominate monitoring during 1st stage: continuous electronic fetal monitoring     Antibiotics received during labor?: Yes  Reason for Antibiotics: GBS  Antibiotics received for GBS:  Penicillin  Antibiotics Given (GBS): Greater than 4 hours prior to delivery       Rupture date/time: 24 1745   Rupture type: Spontaneous Rupture of Membranes  Fluid color: Clear  Fluid odor: Normal       Induction date/time:      Cervical ripening date/time:      Indications for induction:  Prelabor ROM     Augmentation: Oxytocin       Delivery/Placenta Date and Time      Delivery Date: 6/10/24 Delivery Time:  6:26 PM   Placenta Date/Time: 6/10/2024  6:29 PM  Oxytocin given at the time of delivery: after delivery of placenta  Delivering clinician: Levy Garcia MD   Other personnel present at delivery:  Provider Role   Kaur Varela, NIRAJ Scherer, Edda DUMONT, RN    Zackary, Priti SORENSON, Miguel Yi, Justin Maguire APRN CNP              Vaginal Counts       Initial count performed by 2 team members:  Two Team Members   Dr Jose Varela         Needles Suture Needles Sponges (RETIRED) Instruments   Initial counts 0 0 0    Added to count 0 0 0    Relief counts       Final counts 0 0 0            Placed during labor Accounted for at the end of labor   FSE No NA   IUPC No NA   Cervidil No NA                  Final count performed by 2 team members:  Two Team Members   Dr Jose Scherer RN      Final count correct?: Yes  Pre-Birth Team Brief: Complete  Post-Birth Team Debrief: Complete       Apgars    Living status: Living   1 Minute 5 Minute 10 Minute 15 Minute 20 Minute   Skin color: 0  1       Heart rate: 2  2       Reflex irritability: 2  2       Muscle tone: 2  2       Respiratory effort: 1  2       Total: 7  9       Apgars assigned by: DEE TAPIA       Cord      Vessels: 3 Vessels    Cord Complications: None               Cord Blood Disposition: Discard    Gases Sent?: No    Delayed cord clamping?: Yes    Cord Clamping Delay (seconds): 31-60 seconds    Stem cell collection?: No           [place] Resuscitation    Methods: None   Care at Delivery:  Called by Dr. Garcia to attend delivery secondary to 36 2/7 weeks gestation. Infant delivered at 1826 and cried. Infant placed on Mom's chest for 1 minute delayed cord clamping then taken to warmer where she continued to cry vigorously. She was returned to Mom for routine LPI cares.    FELIZ Wu 6/10/2024 6:40 PM         Skin to Skin and Feeding Plan      Skin to skin initiation date/time: 1/6/1841    Skin to skin with: Mother  Skin to skin end date/time:            Labor Events and Shoulder Dystocia    Fetal Tracing Prior to Delivery: Category 1  Shoulder dystocia present?: Neg       Delivery (Maternal) (Provider to Complete) (242356)    Episiotomy: None  Perineal lacerations: 1st Repaired?: No   Est. blood loss (mL): 100  Repair suture: None  Number of repair packets: 0  Genital tract inspection done: Pos       Blood Loss  Mother: Zainab Mar #[id number]     Start of Mother's Information      Delivery Blood Loss  06/10/24 1430 - 06/10/24 1918      EBL (mL) Hospital Encounter 100 mL    Delivery QBL (mL) Hospital Encounter 120 mL    Total  220 mL               End of Mother's Information  Mother: Zainab Mar #[id number]                Delivery - Provider to Complete (485832)    Delivering clinician: Levy Garcia MD  Delivery Type (Choose the 1 that will go to the Birth History): Vaginal, Spontaneous                         Other personnel:  Provider Role   Kaur Varela RN Soika, NIRAJ Morrow Elizabeth E, RN Erickson, Steve G, RT    Justin Hubbard, RONY CNP                     Placenta    Date/Time: 6/10/2024  6:29 PM  Removal: Spontaneous  Disposition: Hospital disposal             Anesthesia    Method: Epidural  Cervical dilation at placement: 0-3                    Presentation and Position    Presentation: Vertex    Position: Left Occiput Anterior                     Levy Garcia MD

## 2024-06-11 NOTE — PLAN OF CARE
Patient's vitals and maternal assessment WDL. Up independently caring for self and infant. Breastfeeding well. Patient is attentive to infant needs, asking appropriate questions and holds infant frequently. Patient voiding adequately.     Problem: Adult Inpatient Plan of Care  Goal: Optimal Comfort and Wellbeing  6/11/2024 0552 by Kinjal Lyons RN  Outcome: Progressing  6/10/2024 2151 by Kinjal Lyons RN  Outcome: Progressing  Intervention: Monitor Pain and Promote Comfort  Recent Flowsheet Documentation  Taken 6/10/2024 2350 by Kinjal Lyons RN  Pain Management Interventions: medication offered but refused  Intervention: Provide Person-Centered Care  Recent Flowsheet Documentation  Taken 6/10/2024 2350 by Kinjal Lyons RN  Trust Relationship/Rapport:   care explained   choices provided   emotional support provided   empathic listening provided   questions answered   questions encouraged   reassurance provided   thoughts/feelings acknowledged  Taken 6/10/2024 1915 by Kinjal Lyons RN  Trust Relationship/Rapport:   care explained   choices provided   emotional support provided   empathic listening provided   questions answered   questions encouraged   reassurance provided   thoughts/feelings acknowledged     Problem: Postpartum (Vaginal Delivery)  Goal: Optimal Pain Control and Function  6/11/2024 0552 by Kinjal Lyons RN  Outcome: Progressing  6/10/2024 2152 by Kinjal Lyons RN  Outcome: Progressing  Intervention: Prevent or Manage Pain  Recent Flowsheet Documentation  Taken 6/10/2024 2350 by Kinjal Lyons RN  Pain Management Interventions: medication offered but refused  Perineal Care:   absorbent brief/pad changed   cold pack/ice pack applied   medicated pads applied   perineal hygiene encouraged   perineal spray bottle/warm water use encouraged   perineum cleansed  Taken 6/10/2024 2030 by Kinjal Lyons, RN  Perineal Care:   absorbent brief/pad changed   cold  pack/ice pack applied   medicated pads applied   perineal hygiene encouraged   perineal spray bottle/warm water use encouraged   perineum cleansed     Problem: Postpartum (Vaginal Delivery)  Goal: Effective Urinary Elimination  6/11/2024 0552 by Kinjal Lyons, RN  Outcome: Progressing  6/10/2024 2152 by Kinjal Lyons, RN  Outcome: Progressing     Problem: Breastfeeding  Goal: Effective Breastfeeding  6/11/2024 0552 by Kinjal Lyons, RN  Outcome: Progressing  6/10/2024 2152 by Kinjal Lyons, RN  Outcome: Progressing   Goal Outcome Evaluation:      Plan of Care Reviewed With: patient    Overall Patient Progress: improvingOverall Patient Progress: improving

## 2024-06-11 NOTE — LACTATION NOTE
Follow up Lactation Visit    Hours since Delivery: 21 hours old    Gestational Age at Delivery: 36w2d     Diaper Count: 3 wet 5 soiled     Breast Assessment: Rounded symmetrical breasts. Left nipple everts, right nipple slight eversion with stimulation, but quickly flattens.     Feeding Assessment: Infant had been skin to skin with Mother for 35 minutes prior to visit today and almost 4 hours since previous feed. Attempted waking infant with stimulation, but infant did not wake easily. Parents have been offering supplement via syringe, discussed that supplementation with a 36:2GA infant is going to be for at least 3-4 weeks so encouraged paced bottle feeding. Demonstrated paced bottle feeding and fed infant 5ml of collected colostrum. Attempted putting infant to breast, with and without using the nipple shield but infant too sleepy. Mother attempted bottle feeding infant, but infant sleepy, so LC was able to get the 5ml human donor milk to infant.   Discussed only attempting at the breast for 5 minutes, if infant sleepy than move to the bottle and pumping. Discussed in detail infants gestational age and energy levels.      Breastfeeding Care Plan for Late /Early Term/Low Birth Weight Baby     Babies born early and/or low birth weight present unique challenges when it comes to feeding and require a proactive approach. They tend to be sleepier than normal, have less energy levels and fat reserves. This can make it difficult to wake for feeds and maintain a deep latch at the breast. Therefore, most times to support your newborns nutritional needs they will need to start a supplement and continue to supplement until your milk increases and they are able to transfer what they need from the breast.     Feed every 2-3 hours. Keep breastfeeding efficient. If infant does not latch within 5-10 minutes, or infant sleepy at breast, or not transferring milk then end feeding at breast.   Positioning reminders:  line up  baby's nose to nipple   ear, shoulder, hip, nice straight line   chin off bay's chest; chin touching your breast prior to latch  your thumb lined up like baby's mustache, fingers under breast like a baby's beard  cheeks touching breast  Signs of milk transfer: hearing swallows, comfortable latch, meeting output goals and softening of breasts.   Supplement baby after every breastfeeding with colostrum/breastmilk ( If colostrum/breastmilk is not available, then donor milk or formula may be used) Use below as a guideline; give more as baby cues.    Day 1-2 is 5-15ml per feeding   Day 2-3 is 15-30ml per feeding   Day 3-4 is 30-60ml per feeding   Day 5 and older follow healthcare providers recommendation    Pump for 15-20 minutes     Follow up: will follow up tomorrow, 6/12, for further support and education

## 2024-06-11 NOTE — LACTATION NOTE
This note was copied from a baby's chart.  Lactation Visit:      Hours since Delivery: 17 hours old.    Gestational Age at Delivery: 36.2 weeks.    Visit with Lactation: Mother is a primip with a history of PCOS, anxiety and hypothyroidism. Since birth, Chloé has been to breast 6 times, has received 3mL of MBM via spoon feeding, has voided x2, stooled x3, and will be weighed at 24 hour  screening. Infant has completed initial BS checks for gestational age, and will have a spot check with  screening. Upon entering room, infant was sleeping with NS in her mouth at breast, reviewed waking techniques and normal behaviors at 36 weeks gestation. Discussed hunger cues, hand expression, paced bottle feeding, the importance of tracking infants feedings and diaper output, nipple shield use, as well as supplementation volumes and pumping if infant isn't going to breast for feedings. Education given on importance of infant positioning for a deep, comfortable latch and effective milk transfer. Instructed on techniques for keeping infant actively sucking, including breast compressions. Anticipatory guidance given on input/output goals, normal feeding volumes in the  period, and pumping. Mother states Chloé has been latching shallow on nipple shield, reviewed what to look for what infant at bare breast and while using NS; family verbalized understanding. Mother taught hand expression, and pumping. Chloé showing hunger cues, and supplemented with PDHM via finger/syringe feeding-taking 10mL. Encouraged mother to start skin-to-skin prior to feedings, and to ask for assistance as needed. Encouraged mother to let primary RN know if she would like lactation to return for feeding assistance or if questions arise. Mother aware of lactation resources available to her after discharging from hospital.     Plan: Skin-to-skin prior to feedings. Continue breastfeeding on-demand and/or every 2-3 hours. Attempt at bare breast  for 1-2 minutes, if Chloé is struggling to sustain latch, apply NS. Supplement PRN. Track feedings and diaper output. Mother to pump 8x/day d/t infants gestational age at birth.    Has Breast Pump for Home: No, would like Spectra prior to discharge.    Education given: Stages of milk production after delivery,  behaviors during first few days of life, Hunger cues, Breastfeeding positions, How to obtain & maintain a deep, comfortable latch, Listening & watching for swallows, How do I know if my baby is finished & getting enough, Benefits of skin-to-skin prior to feedings, Paced bottle feeding, Importance of tracking all feedings and diaper output, Nutritive vs non-nutritive sucking, Nipple shield use, Pumping for missed feedings at breast, Burping, Cluster feeding, How to tell if breastfeeding is going well, Supplementation volumes during first few days of life, [place] waking techniques, Pacifier use per AAP, Banked donor breast milk, How to adjust a shallow latch, Techniques for keeping infant actively sucking, including breast compressions, Breast pump use for home, and Late /Early term/Low birth weight  behaviors.

## 2024-06-12 ENCOUNTER — APPOINTMENT (OUTPATIENT)
Dept: CT IMAGING | Facility: HOSPITAL | Age: 29
End: 2024-06-12
Attending: OBSTETRICS & GYNECOLOGY
Payer: COMMERCIAL

## 2024-06-12 PROCEDURE — 70450 CT HEAD/BRAIN W/O DYE: CPT

## 2024-06-12 PROCEDURE — 250N000013 HC RX MED GY IP 250 OP 250 PS 637: Performed by: OBSTETRICS & GYNECOLOGY

## 2024-06-12 PROCEDURE — 120N000001 HC R&B MED SURG/OB

## 2024-06-12 RX ORDER — IBUPROFEN 800 MG/1
800 TABLET, FILM COATED ORAL EVERY 6 HOURS PRN
COMMUNITY
Start: 2024-06-12

## 2024-06-12 RX ORDER — ACETAMINOPHEN 325 MG/1
650 TABLET ORAL EVERY 4 HOURS PRN
COMMUNITY
Start: 2024-06-12

## 2024-06-12 RX ORDER — CYCLOBENZAPRINE HCL 10 MG
10 TABLET ORAL ONCE
Status: COMPLETED | OUTPATIENT
Start: 2024-06-12 | End: 2024-06-12

## 2024-06-12 RX ORDER — DOCUSATE SODIUM 100 MG/1
100 CAPSULE, LIQUID FILLED ORAL DAILY PRN
COMMUNITY
Start: 2024-06-12 | End: 2024-06-13

## 2024-06-12 RX ORDER — CYCLOBENZAPRINE HCL 10 MG
10 TABLET ORAL EVERY 8 HOURS PRN
Status: DISCONTINUED | OUTPATIENT
Start: 2024-06-12 | End: 2024-06-13 | Stop reason: HOSPADM

## 2024-06-12 RX ADMIN — IBUPROFEN 800 MG: 800 TABLET ORAL at 21:22

## 2024-06-12 RX ADMIN — ACETAMINOPHEN 650 MG: 325 TABLET ORAL at 00:28

## 2024-06-12 RX ADMIN — ACETAMINOPHEN 650 MG: 325 TABLET ORAL at 08:35

## 2024-06-12 RX ADMIN — IBUPROFEN 800 MG: 800 TABLET ORAL at 08:35

## 2024-06-12 RX ADMIN — IBUPROFEN 800 MG: 800 TABLET ORAL at 02:42

## 2024-06-12 RX ADMIN — CYCLOBENZAPRINE 10 MG: 10 TABLET, FILM COATED ORAL at 18:41

## 2024-06-12 RX ADMIN — ACETAMINOPHEN 650 MG: 325 TABLET ORAL at 15:14

## 2024-06-12 RX ADMIN — ACETAMINOPHEN 650 MG: 325 TABLET ORAL at 21:22

## 2024-06-12 RX ADMIN — LEVOTHYROXINE SODIUM 100 MCG: 0.03 TABLET ORAL at 05:21

## 2024-06-12 RX ADMIN — IBUPROFEN 800 MG: 800 TABLET ORAL at 15:14

## 2024-06-12 RX ADMIN — DOCUSATE SODIUM 100 MG: 100 CAPSULE, LIQUID FILLED ORAL at 08:36

## 2024-06-12 RX ADMIN — CYCLOBENZAPRINE 10 MG: 10 TABLET, FILM COATED ORAL at 10:53

## 2024-06-12 ASSESSMENT — ACTIVITIES OF DAILY LIVING (ADL)
ADLS_ACUITY_SCORE: 18

## 2024-06-12 NOTE — PROGRESS NOTES
Updated Dr. Sherman regarding MDA's assessment and pt states she would feel more comfortable staying another night and trying to treat the HA. MD to put in orders. Pt will discharge in AM.

## 2024-06-12 NOTE — PROGRESS NOTES
"Birthplace RN Care Coordinator Note    Zainab Mar  [id number]  1995    Chart reviewed, discharge plan discussed with bedside RN, needs assessed. Patient requests home care visit, nurse visit planned for Friday, 24. [facility] Intake contacted, updated by this writer; patient added to [facility] schedule. Follow-up post-delivery appointment to be schedule by patient as instructed at [facility] clinic.    PatientZainab is reported to have support at home and, if stable, would like to discharge later today with Ciarra (\"Chloé\"). RN Care Coordinator will continue to follow and assist if needed with discharge plan.     24 Addendum: Patient did not discharge yesterday, plan to discharge today, . Home nurse visit moved to Saturday, 6/15, as instructed by baby's MD; [facility] Intake updated.  "

## 2024-06-12 NOTE — PLAN OF CARE
Problem: Postpartum (Vaginal Delivery)  Goal: Successful Parent Role Transition  Outcome: Progressing     Problem: Postpartum (Vaginal Delivery)  Goal: Optimal Pain Control and Function  Outcome: Progressing  Intervention: Prevent or Manage Pain  Recent Flowsheet Documentation  Taken 6/12/2024 0835 by Edda Scherer RN  Pain Management Interventions: medication (see MAR)     Problem: Postpartum (Vaginal Delivery)  Goal: Optimal Pain Control and Function  Intervention: Prevent or Manage Pain  Recent Flowsheet Documentation  Taken 6/12/2024 0835 by Edda Scherer RN  Pain Management Interventions: medication (see MAR)     Problem: Breastfeeding  Goal: Effective Breastfeeding  Outcome: Progressing   Goal Outcome Evaluation:       Pt stable and VSS. Pt bonding well with baby. Fundus firm and bleeding light with no clots. Pt continues to report HA with little effect from current pain meds. New order placed for PRN Flexeril.

## 2024-06-12 NOTE — PLAN OF CARE
VSS. Up ad roshan. Breastfeeding and tolerating well, utilizing nipple shield, infant fatigues quickly and sleepy at the breast, patient also pumping and feeding infant maternal expressed colostrum and bottle feeding human donor milk. Scant amount of lochia, no clots noted. Tylenol and Ibuprofen for pain control. BS audible/active x4, tolerating PO, denies N/V. Voiding. Bonding well with infant, attentive to cares. Significant other at bedside and supportive. Continue with plan of care.

## 2024-06-12 NOTE — PROGRESS NOTES
"POSTPARTUM DAY #2/    Subjective:  The patient endorses pounding HA.  Does think better with sleep/rest. Has discussed with anesthesia on Ppd # 1.  Endorses that she thinks it started in labor, does have history of disc impairment.  Otherwise doing well.   Objective   The patient has a blood pressure which is within the normal range. Urinary output is adequate.     Exam:   /67 (BP Location: Left arm, Patient Position: Semi-Juarez's, Cuff Size: Adult Regular)   Pulse 88   Temp 98.6  F (37  C) (Oral)   Resp 18   Ht 1.575 m (5' 2\")   Wt 91.2 kg (201 lb)   LMP 01/10/2023 (Exact Date)   SpO2 97%   Breastfeeding Unknown   BMI 36.76 kg/m    General: NAD, alert and orientated   HEENT: PERRLA bilaterally, petechiae at midline of cornua bilaterally  Abdomen: soft, NT   Fundus: firm, nontender  Ext: no pain     LAB:  Lab Results   Component Value Date    HGB 12.8 2024         I/Os  No intake or output data in the 24 hours ending 24 1047      Impression: PPD # 2 s/p  at 36 weeks after ROM, headache    Plan:   Trial of felexeril to see if HA musculoskeletal, if improved can go home with short term rx, patient to monitor if better with rest and then returns with actvitiy, would have MDA return to discuss consideration of blood patch, if no improvement with flexeril and rest consider CT prior to discharge    Edda Sherman MD FACOG  [facility]  [phone]       "

## 2024-06-12 NOTE — PROGRESS NOTES
Updated Dr. Sherman following pt receiving Flexeril for continued HA. Pt reported that it helped a little while laying down but continues when up and moving again. Per MD, MDA called and requested to come and assess pt and speak to her regarding further possible treatment. MDA to come see pt.

## 2024-06-12 NOTE — PLAN OF CARE
Problem: Adult Inpatient Plan of Care  Goal: Plan of Care Review  Description: The Plan of Care Review/Shift note should be completed every shift.  The Outcome Evaluation is a brief statement about your assessment that the patient is improving, declining, or no change.  This information will be displayed automatically on your shift  note.  Outcome: Progressing   Goal Outcome Evaluation:             VSS, voiding without difficulty, fundus is firm without massage, taking tylenol and ibuprofen for pain relief. Patient teary this shift over baby needing a car seat trial. Reassured patient and educated patient on the need and recommendation for a car seat trial. Patient questions answered.     Cathy Martin RN 2:02 AM 06/12/24

## 2024-06-12 NOTE — DISCHARGE INSTRUCTIONS
*You have a Home Care nurse visit planned for Saturday, 6/15/24. The nurse will contact you after discharge to confirm the appointment time. If you do not hear from the nurse by Friday morning, please call [phone].   (Please do not schedule a clinic appointment on the same day as home nurse visit.)      Warning Signs after Having a Baby    Keep this paper on your fridge or somewhere else where you can see it.    Call your provider if you have any of these symptoms up to 12 weeks after having your baby.    Thoughts of hurting yourself or your baby  Pain in your chest or trouble breathing  Severe headache not helped by pain medicine  Eyesight concerns (blurry vision, seeing spots or flashes of light, other changes to eyesight)  Fainting, shaking or other signs of a seizure    Call 9-1-1 if you feel that it is an emergency.     The symptoms below can happen to anyone after giving birth. They can be very serious. Call your provider if you have any of these warning signs.    My provider s phone number: _______________________    Losing too much blood (hemorrhage)    Call your provider if you soak through a pad in less than an hour or pass blood clots bigger than a golf ball. These may be signs that you are bleeding too much.    Blood clots in the legs or lungs    After you give birth, your body naturally clots its blood to help prevent blood loss. Sometimes this increased clotting can happen in other areas of the body, like the legs or lungs. This can block your blood flow and be very dangerous.     Call your provider if you:  Have a red, swollen spot on the back of your leg that is warm or painful when you touch it.   Are coughing up blood.     Infection    Call your provider if you have any of these symptoms:  Fever of 100.4 F (38 C) or higher.  Pain or redness around your stitches if you had an incision.   Any yellow, white, or green fluid coming from places where you had stitches or surgery.    Mood Problems  (postpartum depression)    Many people feel sad or have mood changes after having a baby. But for some people, these mood swings are worse.     Call your provider right away if you feel so anxious or nervous that you can't care for yourself or your baby.    Preeclampsia (high blood pressure)    Even if you didn't have high blood pressure when you were pregnant, you are at risk for the high blood pressure disease called preeclampsia. This risk can last up to 12 weeks after giving birth.     Call your provider if you have:   Pain on your right side under your rib cage  Sudden swelling in the hands and face    Remember: You know your body. If something doesn't feel right, get medical help.     For informational purposes only. Not to replace the advice of your health care provider. Copyright 2020 [facility]. All rights reserved. Clinically reviewed by Capri Santiago RNC-OB, MSN. Team[employer] [id number] - Rev 02/23.

## 2024-06-12 NOTE — LACTATION NOTE
This note was copied from a baby's chart.  Lactation follow-up Note:      Hours since Delivery: 1 day 17 hours old.    Gestational Age at Delivery: 36.2 weeks.    Visit with Lactation: In the last 24 hours, Chloé has been to breast 3 times, has received 5-15mL of MBM and/or PDHM via bottle, has voided x6, soiled x2, and had a weight loss of 7.25% (since delivery). Mother states she has a headache that has been getting worse since delivery, and it has made it difficult to pump or put Chloé to breast. Encouraged mother to advocate for her needs during her stay, and LC consulted with bedside RN. Mother was encouraged to express 8x/day when she is able. Discussed the benefits of an OP visit with lactation next week, and renting a HGP to help protect milk supply since Chloé isn't going to breast much at this time. Family states they would like to use PDHM after discharge until mothers supply increases; distribution site list given. Encouraged family to continue tracking infants feedings and diaper output. Encouraged mother to let primary RN know if she would like lactation to return for feeding assistance or if questions arise. Mother aware of lactation resources available to her after discharging from hospital.     Plan: Skin-to-skin prior to feedings. Continue breastfeeding or bottle feeding on-demand and/or every 2-3 hours. Attempt at bare breast for 1-2 minutes, if Chloé is struggling to sustain latch, apply NS. Supplement PRN. Track feedings and diaper output. Mother to pump 8x/day d/t infants gestational age at birth.    Has Breast Pump for Home: Discussed renting a HGP, encouraged family to call insurance to verify coverage.    Education given: Stages of milk production after delivery, Hunger cues, Benefits of skin-to-skin prior to feedings, Paced bottle feeding, Importance of tracking all feedings and diaper output, Engorgement, Pumping for missed feedings at breast, Supplementation volumes during first few days  of life, Tight frenulum's, Banked donor breast milk, Exclusive pumping, Breast pump use for home, and Late /Early term/Low birth weight  behaviors.

## 2024-06-12 NOTE — PROGRESS NOTES
Patient: Zainab Mar       Note:  Anesthesia Post Evaluation    I met with Zainab this afternoon at the request of the OBGYN service due to a persistent headache. She was evaluated by my colleague for this headache previously and decided to take conservative measures for alleviation. Headache is described as bandlike, similar to a helmet, worse when sitting up and improved when laying supine. Flexeril did help some, however the headache is still present.  She reports that she is able to walk around the room and to the bathroom without the headache stopping her or making her lay back down. She was also able to converse with me in the room while sitting up at 90 degrees in the bed, denying nausea, photophobia, and worsening of the headache when advancing her sitting from 30 to 90 degrees.     I did offer her a blood patch procedure today, however, she was hesitant to agree to one because she does not want another procedure due to anxiety and financial strain. We discussed being more aggressive with conservative therapy including increasing her PO fluid intake, maintaining her regular daily dose of caffeine + adding additional caffeine later in the day, as well as continuing to take tylenol and ibuprofen.  At this point, because the headache is not limiting her ability to take care of herself and her child, we agreed that this plan was worth trying.     Last vitals:  Vitals:    06/11/24 2011 06/12/24 0030 06/12/24 0827   BP: 127/69 126/80 120/67   Pulse: 92 97 88   Resp: 18 18 18   Temp: 36.9  C (98.5  F) 37.1  C (98.7  F) 37  C (98.6  F)   SpO2: 98% 97%        Electronically Signed By: Dottie Moreno MD  June 12, 2024  2:44 PM

## 2024-06-13 VITALS
SYSTOLIC BLOOD PRESSURE: 132 MMHG | TEMPERATURE: 98.5 F | HEART RATE: 87 BPM | OXYGEN SATURATION: 97 % | BODY MASS INDEX: 37.32 KG/M2 | DIASTOLIC BLOOD PRESSURE: 86 MMHG | HEIGHT: 62 IN | WEIGHT: 202.8 LBS | RESPIRATION RATE: 18 BRPM

## 2024-06-13 PROCEDURE — 250N000013 HC RX MED GY IP 250 OP 250 PS 637: Performed by: OBSTETRICS & GYNECOLOGY

## 2024-06-13 RX ORDER — CYCLOBENZAPRINE HCL 10 MG
10 TABLET ORAL 3 TIMES DAILY PRN
Qty: 12 TABLET | Refills: 0 | Status: SHIPPED | OUTPATIENT
Start: 2024-06-13

## 2024-06-13 RX ORDER — IBUPROFEN 800 MG/1
800 TABLET, FILM COATED ORAL
Qty: 30 TABLET | Refills: 0 | Status: SHIPPED | OUTPATIENT
Start: 2024-06-13

## 2024-06-13 RX ORDER — DOCUSATE SODIUM 100 MG/1
100 CAPSULE, LIQUID FILLED ORAL DAILY PRN
Qty: 30 CAPSULE | Refills: 0 | Status: SHIPPED | OUTPATIENT
Start: 2024-06-13

## 2024-06-13 RX ADMIN — ACETAMINOPHEN 650 MG: 325 TABLET ORAL at 10:04

## 2024-06-13 RX ADMIN — IBUPROFEN 800 MG: 800 TABLET ORAL at 03:19

## 2024-06-13 RX ADMIN — ACETAMINOPHEN 650 MG: 325 TABLET ORAL at 03:20

## 2024-06-13 RX ADMIN — IBUPROFEN 800 MG: 800 TABLET ORAL at 10:03

## 2024-06-13 RX ADMIN — CYCLOBENZAPRINE 10 MG: 10 TABLET, FILM COATED ORAL at 03:19

## 2024-06-13 RX ADMIN — LEVOTHYROXINE SODIUM 100 MCG: 0.03 TABLET ORAL at 05:55

## 2024-06-13 RX ADMIN — DOCUSATE SODIUM 100 MG: 100 CAPSULE, LIQUID FILLED ORAL at 10:02

## 2024-06-13 ASSESSMENT — ACTIVITIES OF DAILY LIVING (ADL)
ADLS_ACUITY_SCORE: 18

## 2024-06-13 NOTE — PLAN OF CARE
Responding to infant cues and breastfeeding independently. Headaches have been tolerable with the help of the tylenol, ibuprofen and flexeril. See previous note about blood pressures. Patient afebrile. Moving and voiding independently.         Problem: Postpartum (Vaginal Delivery)  Goal: Successful Parent Role Transition  Outcome: Progressing  Goal: Absence of Infection Signs and Symptoms  Outcome: Progressing  Goal: Optimal Pain Control and Function  Outcome: Progressing  Intervention: Prevent or Manage Pain  Recent Flowsheet Documentation  Taken 6/13/2024 0422 by Adele Welsh, RN  Pain Management Interventions: quiet environment facilitated  Taken 6/13/2024 0018 by Adele Welsh, RN  Pain Management Interventions: quiet environment facilitated  Perineal Care: perineal spray bottle/warm water use encouraged  Goal: Effective Urinary Elimination  Outcome: Progressing     Problem: Breastfeeding  Goal: Effective Breastfeeding  Outcome: Progressing   Goal Outcome Evaluation:

## 2024-06-13 NOTE — LACTATION NOTE
Follow up Lactation Visit    Hours since Delivery: 63 hours old    Gestational Age at Delivery: 36w2d     Diaper Count: 3 wet 3 soiled , transitional    Feedings: 4breast 10bottle human donor milk 20-30ml     Feeding Assessment: No feeding observed today. Parents explained that infant will be active at the breast for 3 minutes, than 'give up' and prefer the bottle. Re-educated on infants gestational age, 36:2, with infants gestational age and not holding infant to same standard as a full term infant. Mother is now collecting 5ml with pumping sessions, but not feeling well with a headache. Discussed different feeding plans to optimize Mother's rest as well. Mother rented a HGP, highly encouraged a follow up with outpatient lactation by early next week.     Feeding Plan: Breastfeeding Care Plan for Late /Early Term/Low Birth Weight Baby     Babies born early and/or low birth weight present unique challenges when it comes to feeding and require a proactive approach. They tend to be sleepier than normal, have less energy levels and fat reserves. This can make it difficult to wake for feeds and maintain a deep latch at the breast. Therefore, most times to support your newborns nutritional needs they will need to start a supplement and continue to supplement until your milk increases and they are able to transfer what they need from the breast.     Feed every 2-3 hours. Keep breastfeeding efficient. If infant does not latch within 5-10 minutes, or infant sleepy at breast, or not transferring milk then end feeding at breast.   Positioning reminders:  line up baby's nose to nipple   ear, shoulder, hip, nice straight line   chin off bay's chest; chin touching your breast prior to latch  your thumb lined up like baby's mustache, fingers under breast like a baby's beard  cheeks touching breast  Signs of milk transfer: hearing swallows, comfortable latch, meeting output goals and softening of breasts.   Supplement baby  after every breastfeeding with colostrum/breastmilk ( If colostrum/breastmilk is not available, then donor milk or formula may be used) Use below as a guideline; give more as baby cues.    Day 1-2 is 5-15ml per feeding   Day 2-3 is 15-30ml per feeding   Day 3-4 is 30-60ml per feeding   Day 5 and older follow healthcare providers recommendation    Pump for 15-20 minutes   Follow up with your healthcare provider as recommended and lactation consultant within 2-3 days after discharge.  *May do speed round of focusing on pumping and bottling to maximize your rest.       Education:   [x] Expected  feeding patterns in the first few days (pg. 38 of Your Guide to To Postpartum and [place] Care)/ the Second Night  [x] Stages of milk production  [] Benefits of hand expression of colostrum  [] Early feeding cues     [] Benefits of skin to skin  [] Breastfeeding positions  [] Tips to get and maintain a deep latch  [] Nutritive vs.non-nutritive sucking  [] Gentle breast compressions as needed to enhance milk transfer  [] How to tell when baby is finished  [x] Expected  output  []  weight loss  [] Infant Feeding Log  [] Signs breastfeeding is going well (comfortable latch, audible swallows, age appropriate output and weight loss)    [x] Engorgement  [x] Reverse Pressure Softening  [x] Pumping recommendations (based on patient need)  [x] Inpatient breastfeeding support  [x] Outpatient lactation resources    Follow up: Family discharging today. Highly encouraged a follow up with outpatient lactation early next week d/t infant being LPI and triple feeding.

## 2024-06-13 NOTE — PLAN OF CARE
"Data: Vital signs within normal limits. Postpartum checks within normal limits - see flow record. Patient eating and drinking normally. Patient able to empty bladder independently and is up ambulating. No apparent signs of infection.  Laceration  healing well. Patient performing self cares and is able to care for infant.  Action: Patient medicated during the shift for pain and cramping. See MAR. Patient reassessed within 1 hour after each medication and pain was improved - patient stated she was comfortable. Patient education done about pain relieving measure with hot and cold, breast care, breastfeeding support. See flow record.  Response: Positive attachment behaviors observed with infant. Support persons  present.   Plan: Anticipate discharge on 06/13/2024.    Goal Outcome Evaluation:      Plan of Care Reviewed With: patient    Overall Patient Progress: improvingOverall Patient Progress: improving    /77 (BP Location: Left arm, Patient Position: Semi-Juarez's, Cuff Size: Adult Regular)   Pulse 80   Temp 98  F (36.7  C) (Oral)   Resp 18   Ht 1.575 m (5' 2\")   Wt 91.2 kg (201 lb)   LMP 01/10/2023 (Exact Date)   SpO2 96%   Breastfeeding Unknown   BMI 36.76 kg/m      Jackie Sainz RN on 6/12/2024 at 10:01 PM        "

## 2024-06-13 NOTE — PLAN OF CARE
"  Problem: Adult Inpatient Plan of Care  Goal: Plan of Care Review  Description: The Plan of Care Review/Shift note should be completed every shift.  The Outcome Evaluation is a brief statement about your assessment that the patient is improving, declining, or no change.  This information will be displayed automatically on your shift  note.  Outcome: Met  Goal: Patient-Specific Goal (Individualized)  Description: You can add care plan individualizations to a care plan. Examples of Individualization might be:  \"Parent requests to be called daily at 9am for status\", \"I have a hard time hearing out of my right ear\", or \"Do not touch me to wake me up as it startles  me\".  Outcome: Met  Goal: Absence of Hospital-Acquired Illness or Injury  Outcome: Met  Goal: Optimal Comfort and Wellbeing  Outcome: Met  Goal: Readiness for Transition of Care  Outcome: Met     Problem: Postpartum (Vaginal Delivery)  Goal: Successful Parent Role Transition  Outcome: Met  Goal: Hemostasis  Outcome: Met  Goal: Absence of Infection Signs and Symptoms  Outcome: Met  Goal: Anesthesia/Sedation Recovery  Outcome: Met  Goal: Optimal Pain Control and Function  Outcome: Met  Goal: Effective Urinary Elimination  Outcome: Met     Problem: Breastfeeding  Goal: Effective Breastfeeding  Outcome: Met   Goal Outcome Evaluation:                    Discharge instructions and warning signs reviewed with patient. All questions answered. Escorted to main entrance with infant in carseat at 1100. Bands checked and compared with baby.    "

## 2024-06-13 NOTE — PROVIDER NOTIFICATION
06/13/24 0322 06/13/24 0323 06/13/24 0348   Vital Signs   BP (!) 150/87 125/71 137/75   BP Location Left arm  --  Left arm   Patient Position Semi-Juarez's  --  Semi-Juarez's   Cuff Size Adult Regular Adult Large Adult Large         0322: RN took blood pressure with the cuff previous nurses have been using on patient. Elevated pressure noted. RN assessed the size of the cuff compared to patient's arm. Noticed that cuff is too small. Cuff changed to Adult large cuff. BP of 125/71. RN then re measured patient's arm, confirmed that the correct cuff size is the adult large and the recheck pressure was 137/75.

## 2024-06-24 ENCOUNTER — MEDICAL CORRESPONDENCE (OUTPATIENT)
Dept: HEALTH INFORMATION MANAGEMENT | Facility: CLINIC | Age: 29
End: 2024-06-24
Payer: COMMERCIAL

## 2024-06-25 ENCOUNTER — DOCUMENTATION ONLY (OUTPATIENT)
Dept: OBGYN | Facility: HOSPITAL | Age: 29
End: 2024-06-25
Payer: COMMERCIAL

## 2024-06-30 ENCOUNTER — HEALTH MAINTENANCE LETTER (OUTPATIENT)
Age: 29
End: 2024-06-30

## 2024-07-22 ENCOUNTER — LAB REQUISITION (OUTPATIENT)
Dept: LAB | Facility: CLINIC | Age: 29
End: 2024-07-22

## 2024-07-22 DIAGNOSIS — E03.9 HYPOTHYROIDISM, UNSPECIFIED: ICD-10-CM

## 2024-07-22 PROCEDURE — 84439 ASSAY OF FREE THYROXINE: CPT | Performed by: NURSE PRACTITIONER

## 2024-07-22 PROCEDURE — 84443 ASSAY THYROID STIM HORMONE: CPT | Performed by: NURSE PRACTITIONER

## 2024-07-23 LAB
T4 FREE SERPL-MCNC: 1.6 NG/DL (ref 0.9–1.7)
TSH SERPL DL<=0.005 MIU/L-ACNC: 0.11 UIU/ML (ref 0.3–4.2)

## 2024-08-29 ENCOUNTER — DOCUMENTATION ONLY (OUTPATIENT)
Dept: FAMILY MEDICINE | Facility: CLINIC | Age: 29
End: 2024-08-29
Payer: COMMERCIAL

## 2024-08-29 ASSESSMENT — EDINBURGH POSTNATAL DEPRESSION SCALE (EPDS)
I HAVE BLAMED MYSELF UNNECESSARILY WHEN THINGS WENT WRONG: NOT VERY OFTEN
I HAVE FELT SCARED OR PANICKY FOR NO GOOD REASON: NO, NOT MUCH
THE THOUGHT OF HARMING MYSELF HAS OCCURRED TO ME: NEVER
I HAVE BEEN ABLE TO LAUGH AND SEE THE FUNNY SIDE OF THINGS: AS MUCH AS I ALWAYS COULD
THINGS HAVE BEEN GETTING ON TOP OF ME: NO, I HAVE BEEN COPING AS WELL AS EVER
I HAVE LOOKED FORWARD WITH ENJOYMENT TO THINGS: AS MUCH AS I EVER DID
TOTAL SCORE: 3
I HAVE BEEN SO UNHAPPY THAT I HAVE BEEN CRYING: NO, NEVER
I HAVE BEEN SO UNHAPPY THAT I HAVE HAD DIFFICULTY SLEEPING: NOT AT ALL
I HAVE BEEN ANXIOUS OR WORRIED FOR NO GOOD REASON: HARDLY EVER
I HAVE FELT SAD OR MISERABLE: NO, NOT AT ALL

## 2024-10-23 ENCOUNTER — MEDICAL CORRESPONDENCE (OUTPATIENT)
Dept: HEALTH INFORMATION MANAGEMENT | Facility: CLINIC | Age: 29
End: 2024-10-23
Payer: COMMERCIAL

## 2024-12-26 ENCOUNTER — LAB (OUTPATIENT)
Dept: LAB | Facility: CLINIC | Age: 29
End: 2024-12-26
Payer: COMMERCIAL

## 2024-12-26 DIAGNOSIS — L50.1 IDIOPATHIC URTICARIA: ICD-10-CM

## 2024-12-26 LAB
ALBUMIN SERPL BCG-MCNC: 4.4 G/DL (ref 3.5–5.2)
ALP SERPL-CCNC: 134 U/L (ref 40–150)
ALT SERPL W P-5'-P-CCNC: 9 U/L (ref 0–50)
ANION GAP SERPL CALCULATED.3IONS-SCNC: 11 MMOL/L (ref 7–15)
AST SERPL W P-5'-P-CCNC: 15 U/L (ref 0–45)
BASOPHILS # BLD AUTO: 0 10E3/UL (ref 0–0.2)
BASOPHILS NFR BLD AUTO: 0 %
BILIRUB SERPL-MCNC: 0.4 MG/DL
BUN SERPL-MCNC: 13.5 MG/DL (ref 6–20)
CALCIUM SERPL-MCNC: 9.3 MG/DL (ref 8.8–10.4)
CHLORIDE SERPL-SCNC: 107 MMOL/L (ref 98–107)
CREAT SERPL-MCNC: 0.79 MG/DL (ref 0.51–0.95)
EGFRCR SERPLBLD CKD-EPI 2021: >90 ML/MIN/1.73M2
EOSINOPHIL # BLD AUTO: 0.1 10E3/UL (ref 0–0.7)
EOSINOPHIL NFR BLD AUTO: 1 %
ERYTHROCYTE [DISTWIDTH] IN BLOOD BY AUTOMATED COUNT: 12.9 % (ref 10–15)
ERYTHROCYTE [SEDIMENTATION RATE] IN BLOOD BY WESTERGREN METHOD: 22 MM/HR (ref 0–20)
GLUCOSE SERPL-MCNC: 100 MG/DL (ref 70–99)
HBV CORE AB SERPL QL IA: NONREACTIVE
HBV SURFACE AB SERPL IA-ACNC: 5.1 M[IU]/ML
HBV SURFACE AB SERPL IA-ACNC: NONREACTIVE M[IU]/ML
HBV SURFACE AG SERPL QL IA: NONREACTIVE
HCO3 SERPL-SCNC: 22 MMOL/L (ref 22–29)
HCT VFR BLD AUTO: 38.1 % (ref 35–47)
HCV AB SERPL QL IA: NONREACTIVE
HGB BLD-MCNC: 13.2 G/DL (ref 11.7–15.7)
IMM GRANULOCYTES # BLD: 0 10E3/UL
IMM GRANULOCYTES NFR BLD: 0 %
LYMPHOCYTES # BLD AUTO: 2.6 10E3/UL (ref 0.8–5.3)
LYMPHOCYTES NFR BLD AUTO: 35 %
Lab: NORMAL
MCH RBC QN AUTO: 29.5 PG (ref 26.5–33)
MCHC RBC AUTO-ENTMCNC: 34.6 G/DL (ref 31.5–36.5)
MCV RBC AUTO: 85 FL (ref 78–100)
MONOCYTES # BLD AUTO: 0.3 10E3/UL (ref 0–1.3)
MONOCYTES NFR BLD AUTO: 4 %
NEUTROPHILS # BLD AUTO: 4.4 10E3/UL (ref 1.6–8.3)
NEUTROPHILS NFR BLD AUTO: 59 %
PERFORMING LABORATORY: NORMAL
PLATELET # BLD AUTO: 302 10E3/UL (ref 150–450)
POTASSIUM SERPL-SCNC: 3.9 MMOL/L (ref 3.4–5.3)
PROT SERPL-MCNC: 7.2 G/DL (ref 6.4–8.3)
RBC # BLD AUTO: 4.47 10E6/UL (ref 3.8–5.2)
RHEUMATOID FACT SERPL-ACNC: <10 IU/ML
SODIUM SERPL-SCNC: 140 MMOL/L (ref 135–145)
SPECIMEN STATUS: NORMAL
TEST NAME: NORMAL
TOTAL PROTEIN SERUM FOR ELP: 6.6 G/DL (ref 6.4–8.3)
WBC # BLD AUTO: 7.3 10E3/UL (ref 4–11)

## 2025-06-16 NOTE — TELEPHONE ENCOUNTER
Called and scheduled for nexplanon insertion  Leann Durand M.A.     Pt being transported to MRI at this time

## 2025-07-13 ENCOUNTER — HEALTH MAINTENANCE LETTER (OUTPATIENT)
Age: 30
End: 2025-07-13